# Patient Record
Sex: MALE | Race: WHITE | NOT HISPANIC OR LATINO | Employment: FULL TIME | URBAN - METROPOLITAN AREA
[De-identification: names, ages, dates, MRNs, and addresses within clinical notes are randomized per-mention and may not be internally consistent; named-entity substitution may affect disease eponyms.]

---

## 2018-10-26 ENCOUNTER — OFFICE VISIT CONVERTED (OUTPATIENT)
Dept: FAMILY MEDICINE CLINIC | Facility: CLINIC | Age: 53
End: 2018-10-26
Attending: FAMILY MEDICINE

## 2018-12-04 ENCOUNTER — CONVERSION ENCOUNTER (OUTPATIENT)
Dept: FAMILY MEDICINE CLINIC | Facility: CLINIC | Age: 53
End: 2018-12-04

## 2018-12-05 ENCOUNTER — OFFICE VISIT CONVERTED (OUTPATIENT)
Dept: FAMILY MEDICINE CLINIC | Facility: CLINIC | Age: 53
End: 2018-12-05
Attending: NURSE PRACTITIONER

## 2018-12-14 ENCOUNTER — OFFICE VISIT CONVERTED (OUTPATIENT)
Dept: FAMILY MEDICINE CLINIC | Facility: CLINIC | Age: 53
End: 2018-12-14
Attending: NURSE PRACTITIONER

## 2018-12-26 ENCOUNTER — OFFICE VISIT CONVERTED (OUTPATIENT)
Dept: FAMILY MEDICINE CLINIC | Facility: CLINIC | Age: 53
End: 2018-12-26
Attending: NURSE PRACTITIONER

## 2018-12-28 ENCOUNTER — CONVERSION ENCOUNTER (OUTPATIENT)
Dept: FAMILY MEDICINE CLINIC | Facility: CLINIC | Age: 53
End: 2018-12-28

## 2019-01-02 ENCOUNTER — HOSPITAL ENCOUNTER (OUTPATIENT)
Dept: FAMILY MEDICINE CLINIC | Facility: CLINIC | Age: 54
Discharge: HOME | End: 2019-01-02
Attending: NURSE PRACTITIONER

## 2019-01-02 LAB
BASOPHILS # BLD AUTO: 0.01 10*3/UL (ref 0–0.2)
BASOPHILS NFR BLD AUTO: 0.07 % (ref 0–3)
EOSINOPHIL # BLD AUTO: 0.29 10*3/UL (ref 0–0.7)
EOSINOPHIL # BLD AUTO: 4.38 % (ref 0–7)
ERYTHROCYTE [DISTWIDTH] IN BLOOD BY AUTOMATED COUNT: 13.8 % (ref 11.5–14.5)
FERRITIN SERPL-MCNC: 185 NG/ML (ref 30–300)
FOLATE SERPL-MCNC: >20 NG/ML (ref 4.8–20)
HBA1C MFR BLD: 12.4 G/DL (ref 14–18)
HCT VFR BLD AUTO: 36.1 % (ref 42–52)
IRON SATN MFR SERPL: 25 % (ref 20–55)
IRON SERPL-MCNC: 98 UG/DL (ref 70–180)
LYMPHOCYTES # BLD AUTO: 2.59 10*3/UL (ref 1–5)
MCH RBC QN AUTO: 31 PG (ref 27–31)
MCHC RBC AUTO-ENTMCNC: 34.3 G/DL (ref 33–37)
MCV RBC AUTO: 90.5 FL (ref 80–96)
MONOCYTES # BLD AUTO: 0.52 10*3/UL (ref 0.2–1.2)
MONOCYTES NFR BLD AUTO: 7.84 % (ref 3–10)
NEUTROPHILS # BLD AUTO: 3.17 10*3/UL (ref 2–8)
NEUTROPHILS NFR BLD AUTO: 48.3 % (ref 30–85)
NRBC BLD AUTO-RTO: 0 % (ref 0–0.01)
PLATELET # BLD AUTO: 185 10*3/UL (ref 130–400)
PMV BLD AUTO: 8.2 FL (ref 7.4–10.4)
RBC # BLD AUTO: 3.99 10*6/UL (ref 4.7–6.1)
T4 FREE SERPL-MCNC: 0.6 NG/DL (ref 0.9–1.8)
TIBC SERPL-MCNC: 386 UG/DL (ref 245–450)
TRANSFERRIN SERPL-MCNC: 270 MG/DL (ref 215–365)
TSH SERPL-ACNC: 18.21 M[IU]/L (ref 0.27–4.2)
VARIANT LYMPHS NFR BLD MANUAL: 39.4 % (ref 20–45)
VIT B12 SERPL-MCNC: 238 PG/ML (ref 211–911)
WBC # BLD AUTO: 6.57 10*3/UL (ref 4.8–10.8)

## 2019-01-03 LAB
CONV ANTI MICROSOMAL AB: >600 IU/ML (ref 0–34)
THYROGLOBULIN ANTIBODY: 6.5 IU/ML (ref 0–0.9)

## 2019-01-18 ENCOUNTER — OFFICE VISIT CONVERTED (OUTPATIENT)
Dept: FAMILY MEDICINE CLINIC | Facility: CLINIC | Age: 54
End: 2019-01-18
Attending: NURSE PRACTITIONER

## 2019-05-21 ENCOUNTER — OFFICE VISIT CONVERTED (OUTPATIENT)
Dept: FAMILY MEDICINE CLINIC | Facility: CLINIC | Age: 54
End: 2019-05-21
Attending: NURSE PRACTITIONER

## 2019-05-21 ENCOUNTER — HOSPITAL ENCOUNTER (OUTPATIENT)
Dept: FAMILY MEDICINE CLINIC | Facility: CLINIC | Age: 54
Discharge: HOME | End: 2019-05-21
Attending: NURSE PRACTITIONER

## 2019-05-21 LAB
ALBUMIN SERPL-MCNC: 4.2 G/DL (ref 3.5–5)
ALBUMIN/GLOB SERPL: 1.2 {RATIO} (ref 1.4–2.6)
ALP SERPL-CCNC: 83 U/L (ref 56–119)
ALT SERPL-CCNC: 27 U/L (ref 10–40)
ANION GAP SERPL CALC-SCNC: 18 MMOL/L (ref 8–19)
AST SERPL-CCNC: 26 U/L (ref 15–50)
BASOPHILS # BLD AUTO: 0.03 10*3/UL (ref 0–0.2)
BASOPHILS NFR BLD AUTO: 0.4 % (ref 0–3)
BILIRUB SERPL-MCNC: <0.15 MG/DL (ref 0.2–1.3)
BUN SERPL-MCNC: 6 MG/DL (ref 5–25)
BUN/CREAT SERPL: 7 {RATIO} (ref 6–20)
CALCIUM SERPL-MCNC: 9.1 MG/DL (ref 8.7–10.4)
CHLORIDE SERPL-SCNC: 97 MMOL/L (ref 99–111)
CONV ABS IMM GRAN: 0.02 10*3/UL (ref 0–0.2)
CONV CO2: 25 MMOL/L (ref 22–32)
CONV IMMATURE GRAN: 0.2 % (ref 0–1.8)
CONV TOTAL PROTEIN: 7.6 G/DL (ref 6.3–8.2)
CREAT UR-MCNC: 0.89 MG/DL (ref 0.7–1.2)
DEPRECATED RDW RBC AUTO: 47.8 FL (ref 35.1–43.9)
EOSINOPHIL # BLD AUTO: 0.24 10*3/UL (ref 0–0.7)
EOSINOPHIL # BLD AUTO: 3 % (ref 0–7)
ERYTHROCYTE [DISTWIDTH] IN BLOOD BY AUTOMATED COUNT: 13.9 % (ref 11.6–14.4)
FOLATE SERPL-MCNC: 9.8 NG/ML (ref 4.8–20)
GFR SERPLBLD BASED ON 1.73 SQ M-ARVRAT: >60 ML/MIN/{1.73_M2}
GLOBULIN UR ELPH-MCNC: 3.4 G/DL (ref 2–3.5)
GLUCOSE SERPL-MCNC: 124 MG/DL (ref 70–99)
HBA1C MFR BLD: 12.8 G/DL (ref 14–18)
HCT VFR BLD AUTO: 39.2 % (ref 42–52)
LYMPHOCYTES # BLD AUTO: 3.1 10*3/UL (ref 1–5)
MCH RBC QN AUTO: 30.8 PG (ref 27–31)
MCHC RBC AUTO-ENTMCNC: 32.7 G/DL (ref 33–37)
MCV RBC AUTO: 94.2 FL (ref 80–96)
MONOCYTES # BLD AUTO: 0.6 10*3/UL (ref 0.2–1.2)
MONOCYTES NFR BLD AUTO: 7.5 % (ref 3–10)
NEUTROPHILS # BLD AUTO: 4.05 10*3/UL (ref 2–8)
NEUTROPHILS NFR BLD AUTO: 50.3 % (ref 30–85)
NRBC CBCN: 0 % (ref 0–0.7)
OSMOLALITY SERPL CALC.SUM OF ELEC: 281 MOSM/KG (ref 273–304)
PLATELET # BLD AUTO: 357 10*3/UL (ref 130–400)
PMV BLD AUTO: 11 FL (ref 9.4–12.4)
POTASSIUM SERPL-SCNC: 3.6 MMOL/L (ref 3.5–5.3)
RBC # BLD AUTO: 4.16 10*6/UL (ref 4.7–6.1)
SODIUM SERPL-SCNC: 136 MMOL/L (ref 135–147)
T4 FREE SERPL-MCNC: 0.6 NG/DL (ref 0.9–1.8)
TSH SERPL-ACNC: 25.5 M[IU]/L (ref 0.27–4.2)
VARIANT LYMPHS NFR BLD MANUAL: 38.6 % (ref 20–45)
VIT B12 SERPL-MCNC: 396 PG/ML (ref 211–911)
WBC # BLD AUTO: 8.04 10*3/UL (ref 4.8–10.8)

## 2019-05-22 LAB
CONV ANTI MICROSOMAL AB: >600 IU/ML (ref 0–34)
THYROGLOBULIN ANTIBODY: 4.8 IU/ML (ref 0–0.9)

## 2019-05-27 LAB
CONV THYROGLOBULIN ANTIBODY: 4.6 IU/ML (ref 0–0.9)
THYROGLOB SERPL-MCNC: 5.8 NG/ML

## 2019-06-03 ENCOUNTER — HOSPITAL ENCOUNTER (OUTPATIENT)
Dept: OTHER | Facility: HOSPITAL | Age: 54
Discharge: HOME | End: 2019-06-03
Attending: NURSE PRACTITIONER

## 2019-07-02 ENCOUNTER — HOSPITAL ENCOUNTER (OUTPATIENT)
Dept: FAMILY MEDICINE CLINIC | Facility: CLINIC | Age: 54
Discharge: HOME | End: 2019-07-02
Attending: NURSE PRACTITIONER

## 2019-07-02 LAB
T4 FREE SERPL-MCNC: 0.8 NG/DL (ref 0.9–1.8)
TSH SERPL-ACNC: 8.51 M[IU]/L (ref 0.27–4.2)

## 2019-07-03 LAB
CONV ANTI MICROSOMAL AB: >600 IU/ML (ref 0–34)
THYROGLOBULIN ANTIBODY: 4 IU/ML (ref 0–0.9)

## 2019-07-23 ENCOUNTER — OFFICE VISIT CONVERTED (OUTPATIENT)
Dept: FAMILY MEDICINE CLINIC | Facility: CLINIC | Age: 54
End: 2019-07-23
Attending: NURSE PRACTITIONER

## 2019-07-29 ENCOUNTER — CONVERSION ENCOUNTER (OUTPATIENT)
Dept: FAMILY MEDICINE CLINIC | Facility: CLINIC | Age: 54
End: 2019-07-29

## 2019-08-20 ENCOUNTER — CONVERSION ENCOUNTER (OUTPATIENT)
Dept: FAMILY MEDICINE CLINIC | Facility: CLINIC | Age: 54
End: 2019-08-20

## 2019-08-20 ENCOUNTER — HOSPITAL ENCOUNTER (OUTPATIENT)
Dept: FAMILY MEDICINE CLINIC | Facility: CLINIC | Age: 54
Discharge: HOME | End: 2019-08-20
Attending: NURSE PRACTITIONER

## 2019-08-20 LAB
ALBUMIN SERPL-MCNC: 4.5 G/DL (ref 3.5–5)
ALBUMIN/GLOB SERPL: 1.5 {RATIO} (ref 1.4–2.6)
ALP SERPL-CCNC: 79 U/L (ref 56–119)
ALT SERPL-CCNC: 13 U/L (ref 10–40)
ANION GAP SERPL CALC-SCNC: 21 MMOL/L (ref 8–19)
APPEARANCE UR: CLEAR
AST SERPL-CCNC: 16 U/L (ref 15–50)
BASOPHILS # BLD AUTO: 0.03 10*3/UL (ref 0–0.2)
BASOPHILS NFR BLD AUTO: 0.5 % (ref 0–3)
BILIRUB SERPL-MCNC: 0.23 MG/DL (ref 0.2–1.3)
BILIRUB UR QL: NEGATIVE
BUN SERPL-MCNC: 10 MG/DL (ref 5–25)
BUN/CREAT SERPL: 12 {RATIO} (ref 6–20)
CALCIUM SERPL-MCNC: 9.2 MG/DL (ref 8.7–10.4)
CHLORIDE SERPL-SCNC: 102 MMOL/L (ref 99–111)
CHOLEST SERPL-MCNC: 187 MG/DL (ref 107–200)
CHOLEST/HDLC SERPL: 5.8 {RATIO} (ref 3–6)
COLOR UR: YELLOW
CONV ABS IMM GRAN: 0.01 10*3/UL (ref 0–0.2)
CONV CO2: 23 MMOL/L (ref 22–32)
CONV COLLECTION SOURCE (UA): NORMAL
CONV CREATININE URINE, RANDOM: 25.7 MG/DL (ref 10–300)
CONV IMMATURE GRAN: 0.2 % (ref 0–1.8)
CONV MICROALBUM.,U,RANDOM: <12 MG/L (ref 0–20)
CONV TOTAL PROTEIN: 7.5 G/DL (ref 6.3–8.2)
CONV UROBILINOGEN IN URINE BY AUTOMATED TEST STRIP: 0.2 {EHRLICHU}/DL (ref 0.1–1)
CREAT UR-MCNC: 0.85 MG/DL (ref 0.7–1.2)
DEPRECATED RDW RBC AUTO: 48.9 FL (ref 35.1–43.9)
EOSINOPHIL # BLD AUTO: 0.21 10*3/UL (ref 0–0.7)
EOSINOPHIL # BLD AUTO: 3.6 % (ref 0–7)
ERYTHROCYTE [DISTWIDTH] IN BLOOD BY AUTOMATED COUNT: 14.8 % (ref 11.6–14.4)
FOLATE SERPL-MCNC: 7.7 NG/ML (ref 4.8–20)
GFR SERPLBLD BASED ON 1.73 SQ M-ARVRAT: >60 ML/MIN/{1.73_M2}
GLOBULIN UR ELPH-MCNC: 3 G/DL (ref 2–3.5)
GLUCOSE SERPL-MCNC: 114 MG/DL (ref 70–99)
GLUCOSE UR QL: NEGATIVE MG/DL
HCT VFR BLD AUTO: 36.4 % (ref 42–52)
HDLC SERPL-MCNC: 32 MG/DL (ref 40–60)
HGB BLD-MCNC: 12 G/DL (ref 14–18)
HGB UR QL STRIP: NEGATIVE
KETONES UR QL STRIP: NEGATIVE MG/DL
LDLC SERPL CALC-MCNC: 116 MG/DL (ref 70–100)
LEUKOCYTE ESTERASE UR QL STRIP: NEGATIVE
LYMPHOCYTES # BLD AUTO: 2.2 10*3/UL (ref 1–5)
LYMPHOCYTES NFR BLD AUTO: 37.5 % (ref 20–45)
MCH RBC QN AUTO: 29.6 PG (ref 27–31)
MCHC RBC AUTO-ENTMCNC: 33 G/DL (ref 33–37)
MCV RBC AUTO: 89.9 FL (ref 80–96)
MICROALBUMIN/CREAT UR: 46.7 MG/G{CRE} (ref 0–25)
MONOCYTES # BLD AUTO: 0.48 10*3/UL (ref 0.2–1.2)
MONOCYTES NFR BLD AUTO: 8.2 % (ref 3–10)
NEUTROPHILS # BLD AUTO: 2.93 10*3/UL (ref 2–8)
NEUTROPHILS NFR BLD AUTO: 50 % (ref 30–85)
NITRITE UR QL STRIP: NEGATIVE
NRBC CBCN: 0 % (ref 0–0.7)
OSMOLALITY SERPL CALC.SUM OF ELEC: 294 MOSM/KG (ref 273–304)
PH UR STRIP.AUTO: 6.5 [PH] (ref 5–8)
PLATELET # BLD AUTO: 297 10*3/UL (ref 130–400)
PMV BLD AUTO: 11.1 FL (ref 9.4–12.4)
POTASSIUM SERPL-SCNC: 3.9 MMOL/L (ref 3.5–5.3)
PROT UR QL: NEGATIVE MG/DL
RBC # BLD AUTO: 4.05 10*6/UL (ref 4.7–6.1)
SODIUM SERPL-SCNC: 142 MMOL/L (ref 135–147)
SP GR UR: 1.01 (ref 1–1.03)
T4 FREE SERPL-MCNC: 0.9 NG/DL (ref 0.9–1.8)
TRIGL SERPL-MCNC: 195 MG/DL (ref 40–150)
TSH SERPL-ACNC: 6.37 M[IU]/L (ref 0.27–4.2)
VIT B12 SERPL-MCNC: 326 PG/ML (ref 211–911)
VLDLC SERPL-MCNC: 39 MG/DL (ref 5–37)
WBC # BLD AUTO: 5.86 10*3/UL (ref 4.8–10.8)

## 2019-08-21 LAB
CONV ANTI MICROSOMAL AB: >600 IU/ML (ref 0–34)
THYROGLOBULIN ANTIBODY: 5.6 IU/ML (ref 0–0.9)

## 2019-09-10 ENCOUNTER — CONVERSION ENCOUNTER (OUTPATIENT)
Dept: FAMILY MEDICINE CLINIC | Facility: CLINIC | Age: 54
End: 2019-09-10

## 2019-09-18 ENCOUNTER — OFFICE VISIT CONVERTED (OUTPATIENT)
Dept: FAMILY MEDICINE CLINIC | Facility: CLINIC | Age: 54
End: 2019-09-18
Attending: NURSE PRACTITIONER

## 2019-10-21 ENCOUNTER — OFFICE VISIT CONVERTED (OUTPATIENT)
Dept: FAMILY MEDICINE CLINIC | Facility: CLINIC | Age: 54
End: 2019-10-21
Attending: NURSE PRACTITIONER

## 2019-10-21 ENCOUNTER — HOSPITAL ENCOUNTER (OUTPATIENT)
Dept: FAMILY MEDICINE CLINIC | Facility: CLINIC | Age: 54
Discharge: HOME | End: 2019-10-21
Attending: NURSE PRACTITIONER

## 2019-10-21 LAB
ALBUMIN SERPL-MCNC: 4.6 G/DL (ref 3.5–5)
ALBUMIN/GLOB SERPL: 1.3 {RATIO} (ref 1.4–2.6)
ALP SERPL-CCNC: 81 U/L (ref 56–119)
ALT SERPL-CCNC: 12 U/L (ref 10–40)
ANION GAP SERPL CALC-SCNC: 19 MMOL/L (ref 8–19)
APPEARANCE UR: CLEAR
AST SERPL-CCNC: 15 U/L (ref 15–50)
BASOPHILS # BLD AUTO: 0.02 10*3/UL (ref 0–0.2)
BASOPHILS NFR BLD AUTO: 0.2 % (ref 0–3)
BILIRUB SERPL-MCNC: 0.24 MG/DL (ref 0.2–1.3)
BILIRUB UR QL: NEGATIVE
BUN SERPL-MCNC: 14 MG/DL (ref 5–25)
BUN/CREAT SERPL: 15 {RATIO} (ref 6–20)
CALCIUM SERPL-MCNC: 9.9 MG/DL (ref 8.7–10.4)
CHLORIDE SERPL-SCNC: 101 MMOL/L (ref 99–111)
CHOLEST SERPL-MCNC: 215 MG/DL (ref 107–200)
CHOLEST/HDLC SERPL: 6.3 {RATIO} (ref 3–6)
COLOR UR: YELLOW
CONV ABS IMM GRAN: 0.03 10*3/UL (ref 0–0.2)
CONV CO2: 23 MMOL/L (ref 22–32)
CONV COLLECTION SOURCE (UA): NORMAL
CONV CREATININE URINE, RANDOM: 132.1 MG/DL (ref 10–300)
CONV IMMATURE GRAN: 0.4 % (ref 0–1.8)
CONV MICROALBUM.,U,RANDOM: 42.1 MG/L (ref 0–20)
CONV TOTAL PROTEIN: 8.1 G/DL (ref 6.3–8.2)
CONV UROBILINOGEN IN URINE BY AUTOMATED TEST STRIP: 0.2 {EHRLICHU}/DL (ref 0.1–1)
CREAT UR-MCNC: 0.92 MG/DL (ref 0.7–1.2)
DEPRECATED RDW RBC AUTO: 48.8 FL (ref 35.1–43.9)
EOSINOPHIL # BLD AUTO: 0.17 10*3/UL (ref 0–0.7)
EOSINOPHIL # BLD AUTO: 2 % (ref 0–7)
ERYTHROCYTE [DISTWIDTH] IN BLOOD BY AUTOMATED COUNT: 15 % (ref 11.6–14.4)
FERRITIN SERPL-MCNC: 333 NG/ML (ref 30–300)
FOLATE SERPL-MCNC: 11.4 NG/ML (ref 4.8–20)
GFR SERPLBLD BASED ON 1.73 SQ M-ARVRAT: >60 ML/MIN/{1.73_M2}
GLOBULIN UR ELPH-MCNC: 3.5 G/DL (ref 2–3.5)
GLUCOSE SERPL-MCNC: 99 MG/DL (ref 70–99)
GLUCOSE UR QL: NEGATIVE MG/DL
HCT VFR BLD AUTO: 36.1 % (ref 42–52)
HDLC SERPL-MCNC: 34 MG/DL (ref 40–60)
HGB BLD-MCNC: 11.8 G/DL (ref 14–18)
HGB UR QL STRIP: NEGATIVE
IRON SATN MFR SERPL: 16 % (ref 20–55)
IRON SERPL-MCNC: 51 UG/DL (ref 70–180)
KETONES UR QL STRIP: NEGATIVE MG/DL
LDLC SERPL CALC-MCNC: 148 MG/DL (ref 70–100)
LEUKOCYTE ESTERASE UR QL STRIP: NEGATIVE
LYMPHOCYTES # BLD AUTO: 2.57 10*3/UL (ref 1–5)
LYMPHOCYTES NFR BLD AUTO: 30.3 % (ref 20–45)
MCH RBC QN AUTO: 29.1 PG (ref 27–31)
MCHC RBC AUTO-ENTMCNC: 32.7 G/DL (ref 33–37)
MCV RBC AUTO: 89.1 FL (ref 80–96)
MICROALBUMIN/CREAT UR: 31.9 MG/G{CRE} (ref 0–25)
MONOCYTES # BLD AUTO: 0.64 10*3/UL (ref 0.2–1.2)
MONOCYTES NFR BLD AUTO: 7.5 % (ref 3–10)
NEUTROPHILS # BLD AUTO: 5.05 10*3/UL (ref 2–8)
NEUTROPHILS NFR BLD AUTO: 59.6 % (ref 30–85)
NITRITE UR QL STRIP: NEGATIVE
NRBC CBCN: 0 % (ref 0–0.7)
OSMOLALITY SERPL CALC.SUM OF ELEC: 287 MOSM/KG (ref 273–304)
PH UR STRIP.AUTO: 7 [PH] (ref 5–8)
PLATELET # BLD AUTO: 397 10*3/UL (ref 130–400)
PMV BLD AUTO: 10.5 FL (ref 9.4–12.4)
POTASSIUM SERPL-SCNC: 4.6 MMOL/L (ref 3.5–5.3)
PROT UR QL: NEGATIVE MG/DL
RBC # BLD AUTO: 4.05 10*6/UL (ref 4.7–6.1)
SODIUM SERPL-SCNC: 138 MMOL/L (ref 135–147)
SP GR UR: 1.01 (ref 1–1.03)
T4 FREE SERPL-MCNC: 1 NG/DL (ref 0.9–1.8)
TIBC SERPL-MCNC: 326 UG/DL (ref 245–450)
TRANSFERRIN SERPL-MCNC: 228 MG/DL (ref 215–365)
TRIGL SERPL-MCNC: 166 MG/DL (ref 40–150)
TSH SERPL-ACNC: 2.26 M[IU]/L (ref 0.27–4.2)
VIT B12 SERPL-MCNC: 500 PG/ML (ref 211–911)
VLDLC SERPL-MCNC: 33 MG/DL (ref 5–37)
WBC # BLD AUTO: 8.48 10*3/UL (ref 4.8–10.8)

## 2019-10-22 LAB
CONV ANTI MICROSOMAL AB: >600 IU/ML (ref 0–34)
THYROGLOBULIN ANTIBODY: 5.5 IU/ML (ref 0–0.9)

## 2020-02-28 ENCOUNTER — OFFICE VISIT CONVERTED (OUTPATIENT)
Dept: FAMILY MEDICINE CLINIC | Facility: CLINIC | Age: 55
End: 2020-02-28
Attending: NURSE PRACTITIONER

## 2020-02-28 ENCOUNTER — HOSPITAL ENCOUNTER (OUTPATIENT)
Dept: FAMILY MEDICINE CLINIC | Facility: CLINIC | Age: 55
Discharge: HOME | End: 2020-02-28
Attending: NURSE PRACTITIONER

## 2020-02-28 LAB
ALBUMIN SERPL-MCNC: 4.5 G/DL (ref 3.5–5)
ALBUMIN/GLOB SERPL: 1.3 {RATIO} (ref 1.4–2.6)
ALP SERPL-CCNC: 80 U/L (ref 56–119)
ALT SERPL-CCNC: 20 U/L (ref 10–40)
ANION GAP SERPL CALC-SCNC: 29 MMOL/L (ref 8–19)
APPEARANCE UR: CLEAR
AST SERPL-CCNC: 21 U/L (ref 15–50)
BASOPHILS # BLD AUTO: 0.04 10*3/UL (ref 0–0.2)
BASOPHILS NFR BLD AUTO: 0.4 % (ref 0–3)
BILIRUB SERPL-MCNC: 0.17 MG/DL (ref 0.2–1.3)
BILIRUB UR QL: NEGATIVE
BUN SERPL-MCNC: 9 MG/DL (ref 5–25)
BUN/CREAT SERPL: 8 {RATIO} (ref 6–20)
CALCIUM SERPL-MCNC: 9.6 MG/DL (ref 8.7–10.4)
CHLORIDE SERPL-SCNC: 98 MMOL/L (ref 99–111)
CHOLEST SERPL-MCNC: 214 MG/DL (ref 107–200)
CHOLEST/HDLC SERPL: 5.6 {RATIO} (ref 3–6)
COLOR UR: YELLOW
CONV ABS IMM GRAN: 0.03 10*3/UL (ref 0–0.2)
CONV CO2: 17 MMOL/L (ref 22–32)
CONV COLLECTION SOURCE (UA): NORMAL
CONV CREATININE URINE, RANDOM: 46.4 MG/DL (ref 10–300)
CONV IMMATURE GRAN: 0.3 % (ref 0–1.8)
CONV MICROALBUM.,U,RANDOM: <12 MG/L (ref 0–20)
CONV TOTAL PROTEIN: 8.1 G/DL (ref 6.3–8.2)
CONV UROBILINOGEN IN URINE BY AUTOMATED TEST STRIP: 0.2 {EHRLICHU}/DL (ref 0.1–1)
CREAT UR-MCNC: 1.09 MG/DL (ref 0.7–1.2)
DEPRECATED RDW RBC AUTO: 49 FL (ref 35.1–43.9)
EOSINOPHIL # BLD AUTO: 0.27 10*3/UL (ref 0–0.7)
EOSINOPHIL # BLD AUTO: 2.8 % (ref 0–7)
ERYTHROCYTE [DISTWIDTH] IN BLOOD BY AUTOMATED COUNT: 14.4 % (ref 11.6–14.4)
GFR SERPLBLD BASED ON 1.73 SQ M-ARVRAT: >60 ML/MIN/{1.73_M2}
GLOBULIN UR ELPH-MCNC: 3.6 G/DL (ref 2–3.5)
GLUCOSE SERPL-MCNC: 81 MG/DL (ref 70–99)
GLUCOSE UR QL: NEGATIVE MG/DL
HCT VFR BLD AUTO: 39.9 % (ref 42–52)
HDLC SERPL-MCNC: 38 MG/DL (ref 40–60)
HGB BLD-MCNC: 13 G/DL (ref 14–18)
HGB UR QL STRIP: NEGATIVE
KETONES UR QL STRIP: NEGATIVE MG/DL
LDLC SERPL CALC-MCNC: 124 MG/DL (ref 70–100)
LEUKOCYTE ESTERASE UR QL STRIP: NEGATIVE
LYMPHOCYTES # BLD AUTO: 2.99 10*3/UL (ref 1–5)
LYMPHOCYTES NFR BLD AUTO: 31.1 % (ref 20–45)
MCH RBC QN AUTO: 30.1 PG (ref 27–31)
MCHC RBC AUTO-ENTMCNC: 32.6 G/DL (ref 33–37)
MCV RBC AUTO: 92.4 FL (ref 80–96)
MICROALBUMIN/CREAT UR: 25.9 MG/G{CRE} (ref 0–25)
MONOCYTES # BLD AUTO: 0.84 10*3/UL (ref 0.2–1.2)
MONOCYTES NFR BLD AUTO: 8.7 % (ref 3–10)
NEUTROPHILS # BLD AUTO: 5.44 10*3/UL (ref 2–8)
NEUTROPHILS NFR BLD AUTO: 56.7 % (ref 30–85)
NITRITE UR QL STRIP: NEGATIVE
NRBC CBCN: 0 % (ref 0–0.7)
OSMOLALITY SERPL CALC.SUM OF ELEC: 288 MOSM/KG (ref 273–304)
PH UR STRIP.AUTO: 5.5 [PH] (ref 5–8)
PLATELET # BLD AUTO: 340 10*3/UL (ref 130–400)
PMV BLD AUTO: 10.9 FL (ref 9.4–12.4)
POTASSIUM SERPL-SCNC: 4.1 MMOL/L (ref 3.5–5.3)
PROT UR QL: NEGATIVE MG/DL
PSA SERPL-MCNC: 0.61 NG/ML (ref 0–4)
RBC # BLD AUTO: 4.32 10*6/UL (ref 4.7–6.1)
SODIUM SERPL-SCNC: 140 MMOL/L (ref 135–147)
SP GR UR: 1.01 (ref 1–1.03)
T4 FREE SERPL-MCNC: 1 NG/DL (ref 0.9–1.8)
TRIGL SERPL-MCNC: 259 MG/DL (ref 40–150)
TSH SERPL-ACNC: 6.69 M[IU]/L (ref 0.27–4.2)
VLDLC SERPL-MCNC: 52 MG/DL (ref 5–37)
WBC # BLD AUTO: 9.61 10*3/UL (ref 4.8–10.8)

## 2020-06-09 ENCOUNTER — HOSPITAL ENCOUNTER (OUTPATIENT)
Dept: FAMILY MEDICINE CLINIC | Facility: CLINIC | Age: 55
Discharge: HOME | End: 2020-06-09
Attending: NURSE PRACTITIONER

## 2020-06-10 LAB
T4 FREE SERPL-MCNC: 1 NG/DL (ref 0.9–1.8)
TSH SERPL-ACNC: 3.18 M[IU]/L (ref 0.27–4.2)

## 2020-10-07 ENCOUNTER — HOSPITAL ENCOUNTER (OUTPATIENT)
Dept: FAMILY MEDICINE CLINIC | Facility: CLINIC | Age: 55
Discharge: HOME | End: 2020-10-07
Attending: FAMILY MEDICINE

## 2020-10-07 ENCOUNTER — CONVERSION ENCOUNTER (OUTPATIENT)
Dept: FAMILY MEDICINE CLINIC | Facility: CLINIC | Age: 55
End: 2020-10-07

## 2020-10-07 ENCOUNTER — OFFICE VISIT CONVERTED (OUTPATIENT)
Dept: FAMILY MEDICINE CLINIC | Facility: CLINIC | Age: 55
End: 2020-10-07
Attending: FAMILY MEDICINE

## 2020-10-07 LAB
ALBUMIN SERPL-MCNC: 4.5 G/DL (ref 3.5–5)
ALBUMIN/GLOB SERPL: 1.6 {RATIO} (ref 1.4–2.6)
ALP SERPL-CCNC: 79 U/L (ref 56–119)
ALT SERPL-CCNC: 15 U/L (ref 10–40)
ANION GAP SERPL CALC-SCNC: 19 MMOL/L (ref 8–19)
AST SERPL-CCNC: 21 U/L (ref 15–50)
BASOPHILS # BLD AUTO: 0.03 10*3/UL (ref 0–0.2)
BASOPHILS # BLD: 0 % (ref 0–3)
BASOPHILS NFR BLD AUTO: 0.4 % (ref 0–3)
BILIRUB SERPL-MCNC: 0.18 MG/DL (ref 0.2–1.3)
BUN SERPL-MCNC: 24 MG/DL (ref 5–25)
BUN/CREAT SERPL: 19 {RATIO} (ref 6–20)
BURR CELLS BLD QL SMEAR: ABNORMAL
CALCIUM SERPL-MCNC: 9.3 MG/DL (ref 8.7–10.4)
CHLORIDE SERPL-SCNC: 98 MMOL/L (ref 99–111)
CHOLEST SERPL-MCNC: 202 MG/DL (ref 107–200)
CHOLEST/HDLC SERPL: 6.7 {RATIO} (ref 3–6)
CONV ABS BANDS: 4 % (ref 1–5)
CONV ABS IMM GRAN: 0.02 10*3/UL (ref 0–0.2)
CONV ANISOCYTES: SLIGHT
CONV ATYPICAL LYMPHOCYTES: 2 % (ref 0–5)
CONV CO2: 24 MMOL/L (ref 22–32)
CONV IMMATURE GRAN: 0.3 % (ref 0–1.8)
CONV SEGMENTED NEUTROPHILS: 50 % (ref 45–70)
CONV TOTAL PROTEIN: 7.4 G/DL (ref 6.3–8.2)
CREAT UR-MCNC: 1.28 MG/DL (ref 0.7–1.2)
DACRYOCYTES BLD QL SMEAR: SLIGHT
DEPRECATED RDW RBC AUTO: 44.4 FL (ref 35.1–43.9)
EOSINOPHIL # BLD AUTO: 0.22 10*3/UL (ref 0–0.7)
EOSINOPHIL # BLD AUTO: 2.8 % (ref 0–7)
EOSINOPHIL NFR BLD AUTO: 1 % (ref 0–7)
ERYTHROCYTE [DISTWIDTH] IN BLOOD BY AUTOMATED COUNT: 13.3 % (ref 11.6–14.4)
GFR SERPLBLD BASED ON 1.73 SQ M-ARVRAT: >60 ML/MIN/{1.73_M2}
GLOBULIN UR ELPH-MCNC: 2.9 G/DL (ref 2–3.5)
GLUCOSE SERPL-MCNC: 98 MG/DL (ref 70–99)
HCT VFR BLD AUTO: 34 % (ref 42–52)
HDLC SERPL-MCNC: 30 MG/DL (ref 40–60)
HGB BLD-MCNC: 11.4 G/DL (ref 14–18)
LARGE PLATELETS: SLIGHT
LDLC SERPL CALC-MCNC: 93 MG/DL (ref 70–100)
LYMPHOCYTES # BLD AUTO: 3.56 10*3/UL (ref 1–5)
LYMPHOCYTES NFR BLD AUTO: 44.7 % (ref 20–45)
MACROCYTES BLD QL SMEAR: SLIGHT
MCH RBC QN AUTO: 30.8 PG (ref 27–31)
MCHC RBC AUTO-ENTMCNC: 33.5 G/DL (ref 33–37)
MCV RBC AUTO: 91.9 FL (ref 80–96)
MONOCYTES # BLD AUTO: 0.59 10*3/UL (ref 0.2–1.2)
MONOCYTES NFR BLD AUTO: 7.4 % (ref 3–10)
MONOCYTES NFR BLD MANUAL: 4 % (ref 3–10)
NEUTROPHILS # BLD AUTO: 3.54 10*3/UL (ref 2–8)
NEUTROPHILS NFR BLD AUTO: 44.4 % (ref 30–85)
NRBC CBCN: 0 % (ref 0–0.7)
NUC CELL # PRT MANUAL: 0 /100{WBCS}
OSMOLALITY SERPL CALC.SUM OF ELEC: 288 MOSM/KG (ref 273–304)
OVALOCYTES BLD QL SMEAR: SLIGHT
PLAT MORPH BLD: NORMAL
PLATELET # BLD AUTO: 382 10*3/UL (ref 130–400)
PMV BLD AUTO: 10.3 FL (ref 9.4–12.4)
POIKILOCYTOSIS BLD QL SMEAR: SLIGHT
POLYCHROMASIA BLD QL SMEAR: SLIGHT
POTASSIUM SERPL-SCNC: 3.6 MMOL/L (ref 3.5–5.3)
RBC # BLD AUTO: 3.7 10*6/UL (ref 4.7–6.1)
SMALL PLATELETS BLD QL SMEAR: ADEQUATE
SODIUM SERPL-SCNC: 137 MMOL/L (ref 135–147)
T4 FREE SERPL-MCNC: 1 NG/DL (ref 0.9–1.8)
TRIGL SERPL-MCNC: 397 MG/DL (ref 40–150)
TSH SERPL-ACNC: 3.95 M[IU]/L (ref 0.27–4.2)
VARIANT LYMPHS NFR BLD MANUAL: 39 % (ref 20–45)
VLDLC SERPL-MCNC: 79 MG/DL (ref 5–37)
WBC # BLD AUTO: 7.96 10*3/UL (ref 4.8–10.8)

## 2020-10-08 LAB — HCV AB SER DONR QL: <0.1 S/CO RATIO (ref 0–0.9)

## 2020-10-09 LAB
CONV ANTI MICROSOMAL AB: >600 IU/ML (ref 0–34)
THYROGLOBULIN ANTIBODY: 3.6 IU/ML (ref 0–0.9)

## 2021-05-09 VITALS
BODY MASS INDEX: 28.49 KG/M2 | DIASTOLIC BLOOD PRESSURE: 80 MMHG | HEART RATE: 96 BPM | WEIGHT: 188 LBS | HEIGHT: 68 IN | SYSTOLIC BLOOD PRESSURE: 120 MMHG | OXYGEN SATURATION: 96 %

## 2021-05-09 VITALS
TEMPERATURE: 97.5 F | HEIGHT: 68 IN | SYSTOLIC BLOOD PRESSURE: 142 MMHG | DIASTOLIC BLOOD PRESSURE: 84 MMHG | HEART RATE: 85 BPM | WEIGHT: 190.5 LBS | OXYGEN SATURATION: 97 % | BODY MASS INDEX: 28.87 KG/M2

## 2021-05-09 VITALS — DIASTOLIC BLOOD PRESSURE: 64 MMHG | SYSTOLIC BLOOD PRESSURE: 144 MMHG

## 2021-05-09 VITALS
WEIGHT: 189 LBS | HEART RATE: 95 BPM | OXYGEN SATURATION: 97 % | SYSTOLIC BLOOD PRESSURE: 120 MMHG | TEMPERATURE: 96.8 F | DIASTOLIC BLOOD PRESSURE: 74 MMHG

## 2021-05-09 VITALS
HEART RATE: 96 BPM | DIASTOLIC BLOOD PRESSURE: 108 MMHG | SYSTOLIC BLOOD PRESSURE: 220 MMHG | WEIGHT: 191 LBS | OXYGEN SATURATION: 99 %

## 2021-05-09 VITALS
HEIGHT: 68 IN | WEIGHT: 195.06 LBS | DIASTOLIC BLOOD PRESSURE: 76 MMHG | SYSTOLIC BLOOD PRESSURE: 130 MMHG | TEMPERATURE: 98.1 F | HEART RATE: 107 BPM | OXYGEN SATURATION: 98 % | BODY MASS INDEX: 29.56 KG/M2

## 2021-05-09 VITALS
TEMPERATURE: 97.5 F | DIASTOLIC BLOOD PRESSURE: 98 MMHG | SYSTOLIC BLOOD PRESSURE: 168 MMHG | OXYGEN SATURATION: 99 % | HEART RATE: 90 BPM | WEIGHT: 190 LBS | SYSTOLIC BLOOD PRESSURE: 182 MMHG | DIASTOLIC BLOOD PRESSURE: 102 MMHG

## 2021-05-09 VITALS
TEMPERATURE: 97.2 F | SYSTOLIC BLOOD PRESSURE: 110 MMHG | DIASTOLIC BLOOD PRESSURE: 70 MMHG | OXYGEN SATURATION: 97 % | WEIGHT: 189 LBS | HEART RATE: 88 BPM

## 2021-05-09 VITALS
WEIGHT: 200 LBS | OXYGEN SATURATION: 98 % | DIASTOLIC BLOOD PRESSURE: 96 MMHG | HEART RATE: 83 BPM | SYSTOLIC BLOOD PRESSURE: 190 MMHG

## 2021-05-09 VITALS — DIASTOLIC BLOOD PRESSURE: 82 MMHG | SYSTOLIC BLOOD PRESSURE: 162 MMHG

## 2021-05-09 VITALS
OXYGEN SATURATION: 97 % | DIASTOLIC BLOOD PRESSURE: 84 MMHG | WEIGHT: 192 LBS | HEART RATE: 103 BPM | SYSTOLIC BLOOD PRESSURE: 160 MMHG

## 2021-05-09 VITALS — DIASTOLIC BLOOD PRESSURE: 82 MMHG | SYSTOLIC BLOOD PRESSURE: 150 MMHG

## 2021-05-09 VITALS
WEIGHT: 190 LBS | DIASTOLIC BLOOD PRESSURE: 90 MMHG | HEART RATE: 90 BPM | SYSTOLIC BLOOD PRESSURE: 160 MMHG | OXYGEN SATURATION: 97 %

## 2021-05-09 VITALS
DIASTOLIC BLOOD PRESSURE: 82 MMHG | HEART RATE: 84 BPM | OXYGEN SATURATION: 98 % | WEIGHT: 188 LBS | SYSTOLIC BLOOD PRESSURE: 146 MMHG | TEMPERATURE: 97.4 F

## 2021-05-09 VITALS — DIASTOLIC BLOOD PRESSURE: 60 MMHG | SYSTOLIC BLOOD PRESSURE: 132 MMHG

## 2021-05-14 ENCOUNTER — HOSPITAL ENCOUNTER (OUTPATIENT)
Dept: FAMILY MEDICINE CLINIC | Facility: CLINIC | Age: 56
Discharge: HOME | End: 2021-05-14
Attending: NURSE PRACTITIONER

## 2021-05-14 ENCOUNTER — OFFICE VISIT CONVERTED (OUTPATIENT)
Dept: FAMILY MEDICINE CLINIC | Facility: CLINIC | Age: 56
End: 2021-05-14
Attending: NURSE PRACTITIONER

## 2021-05-14 LAB
ALBUMIN SERPL-MCNC: 4.7 G/DL (ref 3.5–5)
ALBUMIN/GLOB SERPL: 1.3 {RATIO} (ref 1.4–2.6)
ALP SERPL-CCNC: 72 U/L (ref 56–119)
ALT SERPL-CCNC: 19 U/L (ref 10–40)
ANION GAP SERPL CALC-SCNC: 23 MMOL/L (ref 8–19)
APPEARANCE UR: CLEAR
AST SERPL-CCNC: 21 U/L (ref 15–50)
BASOPHILS # BLD AUTO: 0.03 10*3/UL (ref 0–0.2)
BASOPHILS NFR BLD AUTO: 0.3 % (ref 0–3)
BILIRUB SERPL-MCNC: 0.26 MG/DL (ref 0.2–1.3)
BILIRUB UR QL: NEGATIVE
BUN SERPL-MCNC: 12 MG/DL (ref 5–25)
BUN/CREAT SERPL: 9 {RATIO} (ref 6–20)
CALCIUM SERPL-MCNC: 9.4 MG/DL (ref 8.7–10.4)
CHLORIDE SERPL-SCNC: 99 MMOL/L (ref 99–111)
CHOLEST SERPL-MCNC: 213 MG/DL (ref 107–200)
CHOLEST/HDLC SERPL: 5.5 {RATIO} (ref 3–6)
COLOR UR: YELLOW
CONV ABS IMM GRAN: 0.03 10*3/UL (ref 0–0.2)
CONV CO2: 20 MMOL/L (ref 22–32)
CONV COLLECTION SOURCE (UA): NORMAL
CONV CREATININE URINE, RANDOM: 182.4 MG/DL (ref 10–300)
CONV IMMATURE GRAN: 0.3 % (ref 0–1.8)
CONV MICROALBUM.,U,RANDOM: 58.3 MG/L (ref 0–20)
CONV TOTAL PROTEIN: 8.3 G/DL (ref 6.3–8.2)
CONV UROBILINOGEN IN URINE BY AUTOMATED TEST STRIP: 0.2 {EHRLICHU}/DL (ref 0.1–1)
CREAT UR-MCNC: 1.28 MG/DL (ref 0.7–1.2)
DEPRECATED RDW RBC AUTO: 47.3 FL (ref 35.1–43.9)
EOSINOPHIL # BLD AUTO: 0.14 10*3/UL (ref 0–0.7)
EOSINOPHIL # BLD AUTO: 1.4 % (ref 0–7)
ERYTHROCYTE [DISTWIDTH] IN BLOOD BY AUTOMATED COUNT: 13.9 % (ref 11.6–14.4)
FERRITIN SERPL-MCNC: 309 NG/ML (ref 30–300)
FOLATE SERPL-MCNC: 3.7 NG/ML (ref 4.8–20)
GFR SERPLBLD BASED ON 1.73 SQ M-ARVRAT: >60 ML/MIN/{1.73_M2}
GLOBULIN UR ELPH-MCNC: 3.6 G/DL (ref 2–3.5)
GLUCOSE SERPL-MCNC: 98 MG/DL (ref 70–99)
GLUCOSE UR QL: NEGATIVE MG/DL
HCT VFR BLD AUTO: 36.9 % (ref 42–52)
HDLC SERPL-MCNC: 39 MG/DL (ref 40–60)
HGB BLD-MCNC: 12.1 G/DL (ref 14–18)
HGB UR QL STRIP: NEGATIVE
IRON SATN MFR SERPL: 13 % (ref 20–55)
IRON SERPL-MCNC: 43 UG/DL (ref 70–180)
KETONES UR QL STRIP: NEGATIVE MG/DL
LDLC SERPL CALC-MCNC: 129 MG/DL (ref 70–100)
LEUKOCYTE ESTERASE UR QL STRIP: NEGATIVE
LYMPHOCYTES # BLD AUTO: 1.84 10*3/UL (ref 1–5)
LYMPHOCYTES NFR BLD AUTO: 18.2 % (ref 20–45)
MCH RBC QN AUTO: 30.4 PG (ref 27–31)
MCHC RBC AUTO-ENTMCNC: 32.8 G/DL (ref 33–37)
MCV RBC AUTO: 92.7 FL (ref 80–96)
MICROALBUMIN/CREAT UR: 32 MG/G{CRE} (ref 0–25)
MONOCYTES # BLD AUTO: 0.67 10*3/UL (ref 0.2–1.2)
MONOCYTES NFR BLD AUTO: 6.6 % (ref 3–10)
NEUTROPHILS # BLD AUTO: 7.38 10*3/UL (ref 2–8)
NEUTROPHILS NFR BLD AUTO: 73.2 % (ref 30–85)
NITRITE UR QL STRIP: NEGATIVE
NRBC CBCN: 0 % (ref 0–0.7)
OSMOLALITY SERPL CALC.SUM OF ELEC: 286 MOSM/KG (ref 273–304)
PH UR STRIP.AUTO: 5.5 [PH] (ref 5–8)
PLATELET # BLD AUTO: 362 10*3/UL (ref 130–400)
PMV BLD AUTO: 10.9 FL (ref 9.4–12.4)
POTASSIUM SERPL-SCNC: 4.3 MMOL/L (ref 3.5–5.3)
PROT UR QL: NORMAL MG/DL
PSA SERPL-MCNC: 0.61 NG/ML (ref 0–4)
RBC # BLD AUTO: 3.98 10*6/UL (ref 4.7–6.1)
SODIUM SERPL-SCNC: 138 MMOL/L (ref 135–147)
SP GR UR: 1.02 (ref 1–1.03)
T4 FREE SERPL-MCNC: 0.9 NG/DL (ref 0.9–1.8)
TIBC SERPL-MCNC: 343 UG/DL (ref 245–450)
TRANSFERRIN SERPL-MCNC: 240 MG/DL (ref 215–365)
TRIGL SERPL-MCNC: 226 MG/DL (ref 40–150)
TSH SERPL-ACNC: 3.96 M[IU]/L (ref 0.27–4.2)
VIT B12 SERPL-MCNC: 667 PG/ML (ref 211–911)
VLDLC SERPL-MCNC: 45 MG/DL (ref 5–37)
WBC # BLD AUTO: 10.09 10*3/UL (ref 4.8–10.8)

## 2021-05-17 LAB
CONV ANTI MICROSOMAL AB: >600 IU/ML (ref 0–34)
THYROGLOBULIN ANTIBODY: 5.8 IU/ML (ref 0–0.9)

## 2021-06-05 NOTE — PROGRESS NOTES
Progress Note      Patient Name: Simon Anthony   Patient ID: [id number]   Sex: Male   YOB: 1965    Primary Care Provider: Soheila LINARES   Referring Provider: Soheila LINARSE    Visit Date: May 14, 2021    Provider: VIJAY Medina   [place] [facility] [place] [address] [place] [address] [place], [place]  [address]   Location Phone: [phone]          Chief Complaint     refills---ran out of 88mcg of thyroid med, had some old 75's so he has been taking those   raised spot on RT elbow       History Of Present Illness  Simon Anthony is a 55 year old /White male who presents for evaluation and treatment of:      follow up on chronic health conditions and medication refills.     HTN with HLD - he is currently out of his lisinopril and HCTZ , as well as Lipitor - BP is elevated today - no adverse s/e with meds    anxiety d/o - he is taking Lexapro, but he is currently out of it - he has been out for a week - he can tell that he is out - this AM his CASSANDRA got aggravated that his dog was walking across the floor and made noises, and she huffed, and that almost instantly set him off - No HI/SI.     hx of anemia - he has not had a c'scope - states he cannot get one anyone time soon - it would need to be about three months from now - no blood loss that he can see; none in his stool anyway as far as he can see.     Hypothyroid - he was last RX the 88mcg dose, but ran out, so taking 75mcg currently - has been taking that for about a week, and he can see a difference in the lower dose.     He is still smoking - he is smoking 1 to 1.5 PPD - depends on mood - he has smoked for the past several years - for about 15 to 20 years. He started when he was 15 and quit at 19, but started back to smoking again.     Last PSA was 2/2020 - normal.     He has a spot on his right elbow that he needs looked at - has been there 3 to 4 weeks. Started out small, but then  grew rapidly. Hurts when he bumps it.     He is also sick today - for the past several days has been experiencing cough and congestion - no shortness of breath, but chest feels tight and he is wheezing. Taking Mucinex DM currently.       Past Medical History  Disease Name Date Onset Notes   Anemia --  --    Anxiety --  --    Hypertension --  --    Hypothyroidism --  --          Past Surgical History  Procedure Name Date Notes   *Denies any surgical procedures --  --          Medication List  Name Date Started Instructions   atorvastatin 20 mg oral tablet 10/07/2020 take 1 tablet (20 mg) by oral route once daily at bedtime   cyanocobalamin (vitamin B-12) 1,000 mcg oral tablet  take 1 tablet by oral route daily   escitalopram oxalate 20 mg oral tablet 10/07/2020 take 1 tablet (20 mg) by oral route once daily   hydrochlorothiazide 25 mg oral tablet 10/07/2020 take 1 tablet (25 mg) by oral route once daily   levothyroxine 88 mcg oral tablet 10/07/2020 take 1 tablet (88 mcg) by oral route once daily   lisinopril 20 mg oral tablet 10/07/2020 take 1 tablet by oral route 2 times a day   Men's One Daily 400- mcg oral tablet  --          Allergy List  Allergen Name Date Reaction Notes   NO KNOWN DRUG ALLERGIES --  --  --        Allergies Reconciled  Family Medical History  Disease Name Relative/Age Notes   DM Type I Grandmother (paternal)/   Grandmother (paternal)   Breast Neoplasm Sister/46   --    Colon Neoplasm Father/33   --    Family History of Colon Cancer Father/   Father   Arthrtis Grandmother (paternal)/   Grandmother (paternal)         Social History  Finding Status Start/Stop Quantity Notes   Alcohol Former --/-- --  drank alcohol in the past, 7 or less drinks per week   Exercises regularly --  --/-- --  0 times per week   Recreational Drug Use Former --/-- --  in the past   Second hand smoke exposure Unknown --/-- --  yes   Tobacco Current every day --/-- 1.5 PPD current every day smoker, smokes 1 to 2 packs  "per day, for 25 years, never uses other tobacco products   Uses seatbelts --  --/-- --  yes         Immunizations  NameDate Admin Mfg Trade Name Lot Number Route Inj VIS Given VIS Publication   Tupidotni80/07/2020 [facility] FLUZONE [id number] IM  10/07/2020 08/15/2019   Comments: NDC#[id number]         Review of Systems  · Constitutional  o Admits  o : fatigue  o Denies  o : fever, chills, body aches  · Eyes  o Denies  o : discharge from eye  · HENT  o Admits  o : nasal congestion, nasal discharge, postnasal drip  o Denies  o : headaches, sinus pain, sore throat, tinnitus, ear pain, ear fullness, itchy eyes  · Cardiovascular  o Denies  o : chest pain, dyspnea on exertion, lower extremity edema, palpitations  · Respiratory  o Admits  o : wheezing, productive cough  o Denies  o : shortness of breath  · Gastrointestinal  o Denies  o : nausea, vomiting, diarrhea, constipation, abdominal pain, blood in stools, hematochezia, melena  · Genitourinary  o Denies  o : dysuria, urinary retention, difficulty voiding, urinary hesitancy, decreased stream, post-void dribbling  · Integument  o Admits  o : new skin lesions  · Neurologic  o Denies  o : dizziness  · Endocrine  o Denies  o : polyuria, polydipsia, cold intolerance, heat intolerance  · Psychiatric  o Admits  o : anxiety, depression  o Denies  o : difficulty sleeping, suicidal ideation, homicidal ideation      Vitals  Date Time BP Position Site L\R Cuff Size HR RR TEMP (F) WT  HT  BMI kg/m2 BSA m2 O2 Sat FR L/min FiO2        05/14/2021 08:36 /90 Sitting    118 - R  98.9 199lbs 0oz 5'  8\" 30.26 2.08 99 %            Physical Examination  · Constitutional  o Appearance  o : well-nourished, well developed, alert, in no acute distress, well-tended appearance, no obvious deformities present  · Head and Face  o HEENT  o : EAC and TMs WNL - OP pink and moist - mild drainage in the posterior OP - tonsils WNL  · Eyes  o Conjunctivae  o : conjunctivae normal, no exudates " present  · Neck  o Inspection/Palpation  o : normal appearance, no masses or tenderness, trachea midline  o Thyroid  o : no thyromegaly  · Respiratory  o Respiratory Effort  o : breathing unlabored  o Auscultation of Lungs  o : clear to auscultation on inhalation - there are expiratory wheezes noted on the left side, both anterior and posterior  · Cardiovascular  o Heart  o :   § Auscultation of Heart  § : regular rate, normal rhythm, no murmurs present  o Peripheral Vascular System  o :   § Carotid Arteries  § : normal pulses bilaterally, no bruits present  § Pedal Pulses  § : bilateral pulse strength 2+  § Extremities  § : no edema  · Lymphatic  o Neck  o : no lymphadenopathy present  · Musculoskeletal  o General  o :   § General Musculoskeletal  § : Muscle tone, strength, and development grossly normal.  · Skin and Subcutaneous Tissue  o General Inspection  o : on the lateral aspect of the right elbow, over the lateral epicondyle, there is a well-circumscribed nodule, approximately the size of a quarter - this is slightly erythemic and scaly.  · Neurologic  o Gait and Station  o :   § Gait Screening  § : normal gait  · Psychiatric  o Mood and Affect  o : mood normal, affect appropriate          Assessment  · Anemia     285.9/D64.9  · Anxiety disorder     300.00/F41.9  · Essential hypertension     401.9/I10  · Hyperlipidemia     272.4/E78.5  · Hypothyroidism     244.9/E03.9  · Nicotine dependence     305.1/F17.200  · Tobacco abuse counseling       Tobacco abuse counseling     V65.42/Z71.6  · Prostate cancer screening     V76.44/Z12.5  · Abnormal skin growth     239.2/D49.2  · Upper respiratory infection with cough and congestion     465.9/J06.9  · Colon cancer screening     V76.51/Z12.11      Plan  · Orders  o Smoking cessation counseling, 3-10 minutes [facility] (45714) - V65.42/Z71.6 - 05/14/2021  o ACO-17: Screened for tobacco use AND received tobacco cessation intervention (4004F) - V65.42/Z71.6 - 05/14/2021  o CBC  with Auto Diff [facility] (24604) - 285.9/D64.9, 401.9/I10, 272.4/E78.5 - 05/14/2021  o CMP [facility] (16395) - 401.9/I10, 272.4/E78.5 - 05/14/2021  o Lipid Panel [facility] (39750) - 401.9/I10, 272.4/E78.5 - 05/14/2021  o Thyroid Profile (THYII) - 244.9/E03.9 - 05/14/2021  o Urinalysis with Reflex Microscopy if abnormal ([facility]) (18079) - 401.9/I10 - 05/14/2021  o Urine microalbumin (81659) - 401.9/I10 - 05/14/2021  o ACO-18: Positive screen for clinical depression using a standardized tool and a follow-up plan documented () - - 05/14/2021  o ACO-39: Current medications updated and reviewed (1159F, ) - - 05/14/2021  o Thyroid antibody panel (90626) - 244.9/E03.9 - 05/14/2021  o Iron Profile (Iron 63706 TIBC 35893 and Transferrin 16181) (IRONP) - 285.9/D64.9 - 05/14/2021  o Ferritin ser/plas (52071) - 285.9/D64.9 - 05/14/2021  o B12 Folate levels (B12FO) - 285.9/D64.9 - 05/14/2021  o PSA Ultrasensitive, ANNUAL SCREENING [facility] (08828) - V76.44/Z12.5 - 05/14/2021  o DERMATOLOGY CONSULTATION (DERMA) - 239.2/D49.2 - 05/14/2021  o COLONOSCOPY REFERRAL (COLON) - V76.51/Z12.11 - 05/14/2021   wants to push this out about 3 months. BV  · Medications  o azithromycin 250 mg oral tablet   SIG: take 2 tablets (500 mg) by oral route once daily for 1 day then 1 tablet (250 mg) by oral route once daily for 4 days   DISP: (6) Tablet with 0 refills  Prescribed on 05/14/2021     o Medrol ([place]) 4 mg oral tablets,dose pack   SIG: take by oral route as directed per package instructions for 6 days   DISP: (1) Dose Pack with 0 refills  Prescribed on 05/14/2021     o Ventolin HFA 90 mcg/actuation inhalation HFA aerosol inhaler   SIG: inhale 1 - 2 puffs (90 - 180 mcg) by inhalation route every 6 hours as needed for 10 days   DISP: (1) Inhaler with 0 refills  Prescribed on 05/14/2021     o atorvastatin 20 mg oral tablet   SIG: take 1 tablet (20 mg) by oral route once daily at bedtime   DISP: (90) Tablet with 1 refills  Refilled on 05/14/2021      o escitalopram oxalate 20 mg oral tablet   SIG: take 1 tablet (20 mg) by oral route once daily   DISP: (90) Tablet with 1 refills  Refilled on 05/14/2021     o hydrochlorothiazide 25 mg oral tablet   SIG: take 1 tablet (25 mg) by oral route once daily   DISP: (90) Tablet with 1 refills  Refilled on 05/14/2021     o levothyroxine 88 mcg oral tablet   SIG: take 1 tablet (88 mcg) by oral route once daily   DISP: (90) Tablet with 1 refills  Refilled on 05/14/2021     o lisinopril 20 mg oral tablet   SIG: take 1 tablet by oral route 2 times a day   DISP: (180) Tablet with 1 refills  Refilled on 05/14/2021     o Medications have been Reconciled  o Transition of Care or Provider Policy  · Instructions  o Advised that cheeses and other sources of dairy fats, animal fats, fast food, and the extras (candy, pasteries, pies, doughnuts and cookies) all contain LDL raising nutrients. Advised to increase fruits, vegetables, whole grains, and to monitor portion sizes.   o *Form of nicotine being used: tobacco/cigarettes  o Patient was strongly encouraged to discontinue use of any nicotine containing product or minimize the use of the product.  o Tobacco and smoking cessation counseling for more than 3 minutes was completed.  o Take all medications as prescribed/directed.  o Patient was educated/instructed on their diagnosis, treatment and medications prior to discharge from the clinic today.  o Patient instructed to seek medical attention urgently for new or worsening symptoms.  o Call the office with any concerns or questions.  o Chronic conditions reviewed and taken into consideration for today's treatment plan.            Electronically Signed by: VIJAY Medina -Author on May 14, 2021 09:09:32 AM

## 2021-07-15 VITALS
TEMPERATURE: 98.9 F | HEART RATE: 118 BPM | BODY MASS INDEX: 30.16 KG/M2 | SYSTOLIC BLOOD PRESSURE: 160 MMHG | WEIGHT: 199 LBS | HEIGHT: 68 IN | OXYGEN SATURATION: 99 % | DIASTOLIC BLOOD PRESSURE: 90 MMHG

## 2021-08-20 ENCOUNTER — OFFICE VISIT (OUTPATIENT)
Dept: FAMILY MEDICINE CLINIC | Facility: CLINIC | Age: 56
End: 2021-08-20

## 2021-08-20 VITALS
DIASTOLIC BLOOD PRESSURE: 76 MMHG | OXYGEN SATURATION: 98 % | BODY MASS INDEX: 30.29 KG/M2 | SYSTOLIC BLOOD PRESSURE: 134 MMHG | WEIGHT: 193 LBS | TEMPERATURE: 96.4 F | HEIGHT: 67 IN | HEART RATE: 77 BPM

## 2021-08-20 DIAGNOSIS — Z83.49: ICD-10-CM

## 2021-08-20 DIAGNOSIS — E61.1 IRON DEFICIENCY: ICD-10-CM

## 2021-08-20 DIAGNOSIS — R53.83 FATIGUE, UNSPECIFIED TYPE: ICD-10-CM

## 2021-08-20 DIAGNOSIS — E03.9 HYPOTHYROIDISM, UNSPECIFIED TYPE: ICD-10-CM

## 2021-08-20 DIAGNOSIS — F41.9 ANXIETY: ICD-10-CM

## 2021-08-20 DIAGNOSIS — I10 ESSENTIAL HYPERTENSION: Primary | ICD-10-CM

## 2021-08-20 PROCEDURE — 99214 OFFICE O/P EST MOD 30 MIN: CPT | Performed by: NURSE PRACTITIONER

## 2021-08-20 RX ORDER — LISINOPRIL 20 MG/1
20 TABLET ORAL 2 TIMES DAILY
COMMUNITY
Start: 2021-05-14 | End: 2021-08-20 | Stop reason: SDUPTHER

## 2021-08-20 RX ORDER — LEVOTHYROXINE SODIUM 88 UG/1
88 TABLET ORAL DAILY
COMMUNITY
Start: 2021-05-14 | End: 2021-08-20 | Stop reason: SDUPTHER

## 2021-08-20 RX ORDER — LEVOTHYROXINE SODIUM 88 UG/1
88 TABLET ORAL DAILY
Qty: 90 TABLET | Refills: 1 | Status: SHIPPED | OUTPATIENT
Start: 2021-08-20 | End: 2022-02-14 | Stop reason: SDUPTHER

## 2021-08-20 RX ORDER — ESCITALOPRAM OXALATE 20 MG/1
20 TABLET ORAL DAILY
Qty: 90 TABLET | Refills: 1 | Status: SHIPPED | OUTPATIENT
Start: 2021-08-20 | End: 2022-02-18 | Stop reason: SDUPTHER

## 2021-08-20 RX ORDER — LISINOPRIL 20 MG/1
20 TABLET ORAL 2 TIMES DAILY
Qty: 180 TABLET | Refills: 1 | Status: SHIPPED | OUTPATIENT
Start: 2021-08-20 | End: 2022-02-18 | Stop reason: SDUPTHER

## 2021-08-20 RX ORDER — ESCITALOPRAM OXALATE 20 MG/1
20 TABLET ORAL DAILY
COMMUNITY
Start: 2021-05-14 | End: 2021-08-20 | Stop reason: SDUPTHER

## 2021-08-20 NOTE — PROGRESS NOTES
Chief Complaint  Hypertension (refills), Hypothyroidism (refills), and Anxiety (refills )    Subjective            Simon Anthony presents to [facility]  History of Present Illness     Follow-up on chronic health conditions and medication refills    Hypertension -he is currently taking lisinopril 20 mg twice daily.  Initial blood pressure was elevated; however, he attributed that to fatigue.  States he has been out of his thyroid medicine for the past 2 to 3 days, and he just got off work.  Continues to work at [employer] third shift.  He denies any chest pain or palpitations.  Denies any headaches, dizziness, or shortness of breath.  No swelling in the legs.  No complaints of cough with use of lisinopril.    Hypothyroidism with a history of persistently elevated thyroid antibodies and abnormal thyroid ultrasound.  He has not established care with endocrinology despite referrals to do so.  His last thyroid ultrasound was in 2019 with recommendations to repeat in 12 months; however, due to insurance issues he has not had a repeat ultrasound to date.  He denies any dysphagia.  He has chronic hoarseness which he says is unchanged.  Denies any odynophagia.    Anxiety, currently prescribed Lexapro.  He reports his symptoms are stable.  Denies any depression.  Denies homicidal ideations or suicidal ideations.  Denies any AVH.    PHQ-2 Total Score: 0      Past Medical History:   Diagnosis Date   • Anemia    • Anxiety    • Hypertension    • Hypothyroidism        No Known Allergies     History reviewed. No pertinent surgical history.     Social History     Tobacco Use   • Smoking status: Current Every Day Smoker     Packs/day: 1.50     Years: 25.00     Pack years: 37.50     Types: Cigarettes   • Smokeless tobacco: Never Used   Substance Use Topics   • Alcohol use: Not Currently     Comment: Usedto drink. 7 or less drinks per week   • Drug use: Not Currently     Comment: Former       Family  "History   Problem Relation Age of Onset   • Colon cancer Father    • Other Father         Colon Neoplasm   • Other Sister         Breast Neoplasm   • Diabetes type I Paternal Grandmother    • Arthritis Paternal Grandmother         Health Maintenance Due   Topic Date Due   • COLORECTAL CANCER SCREENING  Never done   • ANNUAL PHYSICAL  Never done   • Pneumococcal Vaccine 0-64 (1 of 2 - PPSV23) Never done   • COVID-19 Vaccine (1) Never done   • LUNG CANCER SCREENING  Never done        Current Outpatient Medications on File Prior to Visit   Medication Sig   • [DISCONTINUED] escitalopram (LEXAPRO) 20 MG tablet Take 20 mg by mouth Daily.   • [DISCONTINUED] Euthyrox 88 MCG tablet Take 88 mcg by mouth Daily.   • [DISCONTINUED] lisinopril (PRINIVIL,ZESTRIL) 20 MG tablet Take 20 mg by mouth 2 (Two) Times a Day.     No current facility-administered medications on file prior to visit.       Immunization History   Administered Date(s) Administered   • Hepatitis A 06/18/2018   • Influenza, Unspecified 10/21/2019, 10/07/2020   • Shingrix 03/10/2018, 06/18/2018   • Tdap 12/01/2016       Review of Systems     Objective     /76 (BP Location: Right arm, Patient Position: Sitting, Cuff Size: Adult)   Pulse 77   Temp 96.4 °F (35.8 °C)   Ht 170.2 cm (67\")   Wt 87.5 kg (193 lb)   SpO2 98%   BMI 30.23 kg/m²       Physical Exam  Vitals reviewed.   Constitutional:       General: He is not in acute distress.     Appearance: Normal appearance. He is well-developed. He is obese.   HENT:      Head: Normocephalic and atraumatic.   Eyes:      General: No scleral icterus.     Conjunctiva/sclera: Conjunctivae normal.      Pupils: Pupils are equal, round, and reactive to light.   Neck:      Thyroid: No thyroid mass, thyromegaly or thyroid tenderness.      Vascular: No carotid bruit.      Trachea: Trachea normal.   Cardiovascular:      Rate and Rhythm: Normal rate and regular rhythm.      Pulses: Normal pulses.      Heart sounds: No murmur " heard.     Pulmonary:      Effort: Pulmonary effort is normal.      Breath sounds: Normal breath sounds. No wheezing or rhonchi.   Musculoskeletal:         General: Normal range of motion.      Cervical back: Normal range of motion and neck supple.      Right lower leg: No edema.      Left lower leg: No edema.   Lymphadenopathy:      Cervical: No cervical adenopathy.   Skin:     General: Skin is warm and dry.      Findings: Lesion (right elbow) present.   Neurological:      Mental Status: He is alert and oriented to person, place, and time.   Psychiatric:         Mood and Affect: Mood and affect normal.         Behavior: Behavior normal.         Thought Content: Thought content normal.         Judgment: Judgment normal.         Result Review :     The following data was reviewed by: VIJAY Roldan on 08/20/2021:    Common labs    Common Labsle 10/7/20 10/7/20 10/7/20 5/14/21 5/14/21 5/14/21 5/14/21    0824 0824 0824 0901 0901 0901 0901   Glucose  98    98    BUN  24    12    Creatinine  1.28 (A)    1.28 (A)    Sodium  137    138    Potassium  3.6    4.3    Chloride  98 (A)    99    Calcium  9.3    9.4    Albumin  4.5    4.7    Total Bilirubin  0.18 (A)    0.26    Alkaline Phosphatase  79    72    AST (SGOT)  21    21    ALT (SGPT)  15    19    WBC 7.96   10.09      Hemoglobin 11.4 (A)   12.1 (A)      Hematocrit 34.0 (A)   36.9 (A)      Platelets 382   362      Total Cholesterol   202 (A)    213 (A)   Triglycerides   397 (A)    226 (A)   HDL Cholesterol   30 (A)    39 (A)   LDL Cholesterol    93    129 (A)   PSA     0.61     (A) Abnormal value       Comments are available for some flowsheets but are not being displayed.           PSA    PSA 5/14/21   PSA 0.61             Data reviewed: Thyroid US 2019 - abnormal  1. Mildly heterogeneous thyroid parenchyma could reflect thyroiditis or sequela from thyroiditis.    2. There is a 1.3 centimeter isoechoic solid isthmus nodule and a 1.2 centimeter hyperechoic  solid     left thyroid nodule.  These are probably T rads 3 nodules.  Recommend continued follow-up in 12     months.    3. Bilateral lymph nodes are noted the largest on the left measuring up to about 1.2 centimeters     favor reactive.               Assessment and Plan      Diagnoses and all orders for this visit:    1. Essential hypertension (Primary)  -     lisinopril (PRINIVIL,ZESTRIL) 20 MG tablet; Take 1 tablet by mouth 2 (Two) Times a Day.  Dispense: 180 tablet; Refill: 1  -     CBC Auto Differential  -     Comprehensive Metabolic Panel  -     Lipid Panel    2. Family history of non-anemic folate deficiency  -     Vitamin B12 & Folate    3. Hypothyroidism, unspecified type  -     Euthyrox 88 MCG tablet; Take 1 tablet by mouth Daily.  Dispense: 90 tablet; Refill: 1  -     TSH+Free T4  -     Thyroid Peroxidase Antibody; Future    4. Anxiety  -     escitalopram (LEXAPRO) 20 MG tablet; Take 1 tablet by mouth Daily.  Dispense: 90 tablet; Refill: 1    5. Iron deficiency  -     Ferritin  -     Iron Profile    6. Fatigue, unspecified type  -     Ferritin  -     Iron Profile  -     Vitamin B12 & Folate            Follow Up     No follow-ups on file.     He will RTC next week for labs. States he is not fasting today. He is hoping to get his insurance reinstated next week. He has been without insurance d/t missing open enrollment.     We have discussed the need for endocrinology consultation - he has a hx of abnl thyroid US, as well as elevated thyroid antibodies. He declines repeat US today, or referral to endocrinology, due to lack of insurance at this time.     Additionally, patient was previously referred to dermatology for the lesion on his right elbow; however, again, due to lack of insurance he has not had follow-up for this to date.  Once he gets his insurance reinstated he will let me know and we will get his referrals and imaging arranged for him.    Patient was given instructions and counseling regarding his  condition or for health maintenance advice. Please see specific information pulled into the AVS if appropriate.     Simon REID Raj  reports that he has been smoking cigarettes. He has a 37.50 pack-year smoking history. He has never used smokeless tobacco.. I have educated him on the risk of diseases from using tobacco products such as cancer, COPD and heart disease.     I advised him to quit and he is not willing to quit.

## 2021-10-09 ENCOUNTER — APPOINTMENT (OUTPATIENT)
Dept: GENERAL RADIOLOGY | Facility: HOSPITAL | Age: 56
End: 2021-10-09

## 2021-10-09 ENCOUNTER — HOSPITAL ENCOUNTER (EMERGENCY)
Facility: HOSPITAL | Age: 56
Discharge: HOME | End: 2021-10-10
Attending: EMERGENCY MEDICINE | Admitting: EMERGENCY MEDICINE

## 2021-10-09 DIAGNOSIS — R07.9 CHEST PAIN, UNSPECIFIED TYPE: Primary | ICD-10-CM

## 2021-10-09 LAB
ALBUMIN SERPL-MCNC: 4.1 G/DL (ref 3.5–5.2)
ALBUMIN/GLOB SERPL: 1.4 G/DL
ALP SERPL-CCNC: 79 U/L (ref 39–117)
ALT SERPL W P-5'-P-CCNC: 17 U/L (ref 1–41)
ANION GAP SERPL CALCULATED.3IONS-SCNC: 14.8 MMOL/L (ref 5–15)
AST SERPL-CCNC: 18 U/L (ref 1–40)
BASOPHILS # BLD AUTO: 0.04 10*3/MM3 (ref 0–0.2)
BASOPHILS NFR BLD AUTO: 0.6 % (ref 0–1.5)
BILIRUB SERPL-MCNC: 0.2 MG/DL (ref 0–1.2)
BUN SERPL-MCNC: 8 MG/DL (ref 6–20)
BUN/CREAT SERPL: 7.6 (ref 7–25)
CALCIUM SPEC-SCNC: 8.8 MG/DL (ref 8.6–10.5)
CHLORIDE SERPL-SCNC: 102 MMOL/L (ref 98–107)
CK MB SERPL-CCNC: 1.98 NG/ML
CK SERPL-CCNC: 169 U/L (ref 20–200)
CO2 SERPL-SCNC: 21.2 MMOL/L (ref 22–29)
CREAT SERPL-MCNC: 1.05 MG/DL (ref 0.76–1.27)
DEPRECATED RDW RBC AUTO: 47.1 FL (ref 37–54)
EOSINOPHIL # BLD AUTO: 0.23 10*3/MM3 (ref 0–0.4)
EOSINOPHIL NFR BLD AUTO: 3.3 % (ref 0.3–6.2)
ERYTHROCYTE [DISTWIDTH] IN BLOOD BY AUTOMATED COUNT: 14.6 % (ref 12.3–15.4)
GFR SERPL CREATININE-BSD FRML MDRD: 73 ML/MIN/1.73
GLOBULIN UR ELPH-MCNC: 3 GM/DL
GLUCOSE SERPL-MCNC: 143 MG/DL (ref 65–99)
HCT VFR BLD AUTO: 38.5 % (ref 37.5–51)
HGB BLD-MCNC: 13 G/DL (ref 13–17.7)
HOLD SPECIMEN: NORMAL
HOLD SPECIMEN: NORMAL
IMM GRANULOCYTES # BLD AUTO: 0.01 10*3/MM3 (ref 0–0.05)
IMM GRANULOCYTES NFR BLD AUTO: 0.1 % (ref 0–0.5)
LIPASE SERPL-CCNC: 35 U/L (ref 13–60)
LYMPHOCYTES # BLD AUTO: 2.39 10*3/MM3 (ref 0.7–3.1)
LYMPHOCYTES NFR BLD AUTO: 34.2 % (ref 19.6–45.3)
MAGNESIUM SERPL-MCNC: 2.1 MG/DL (ref 1.6–2.6)
MCH RBC QN AUTO: 29.9 PG (ref 26.6–33)
MCHC RBC AUTO-ENTMCNC: 33.8 G/DL (ref 31.5–35.7)
MCV RBC AUTO: 88.5 FL (ref 79–97)
MONOCYTES # BLD AUTO: 0.51 10*3/MM3 (ref 0.1–0.9)
MONOCYTES NFR BLD AUTO: 7.3 % (ref 5–12)
NEUTROPHILS NFR BLD AUTO: 3.81 10*3/MM3 (ref 1.7–7)
NEUTROPHILS NFR BLD AUTO: 54.5 % (ref 42.7–76)
NRBC BLD AUTO-RTO: 0 /100 WBC (ref 0–0.2)
NT-PROBNP SERPL-MCNC: 98.8 PG/ML (ref 0–900)
PLATELET # BLD AUTO: 297 10*3/MM3 (ref 140–450)
PMV BLD AUTO: 10.2 FL (ref 6–12)
POTASSIUM SERPL-SCNC: 4 MMOL/L (ref 3.5–5.2)
PROT SERPL-MCNC: 7.1 G/DL (ref 6–8.5)
RBC # BLD AUTO: 4.35 10*6/MM3 (ref 4.14–5.8)
SODIUM SERPL-SCNC: 138 MMOL/L (ref 136–145)
TROPONIN I SERPL-MCNC: 0.01 NG/ML (ref 0–0.6)
TROPONIN I SERPL-MCNC: 0.03 NG/ML (ref 0–0.6)
WBC # BLD AUTO: 6.99 10*3/MM3 (ref 3.4–10.8)
WHOLE BLOOD HOLD SPECIMEN: NORMAL
WHOLE BLOOD HOLD SPECIMEN: NORMAL

## 2021-10-09 PROCEDURE — 80053 COMPREHEN METABOLIC PANEL: CPT

## 2021-10-09 PROCEDURE — 85025 COMPLETE CBC W/AUTO DIFF WBC: CPT

## 2021-10-09 PROCEDURE — 93010 ELECTROCARDIOGRAM REPORT: CPT | Performed by: INTERNAL MEDICINE

## 2021-10-09 PROCEDURE — 71045 X-RAY EXAM CHEST 1 VIEW: CPT

## 2021-10-09 PROCEDURE — 93005 ELECTROCARDIOGRAM TRACING: CPT

## 2021-10-09 PROCEDURE — 83735 ASSAY OF MAGNESIUM: CPT

## 2021-10-09 PROCEDURE — 83690 ASSAY OF LIPASE: CPT

## 2021-10-09 PROCEDURE — 84484 ASSAY OF TROPONIN QUANT: CPT

## 2021-10-09 PROCEDURE — 83880 ASSAY OF NATRIURETIC PEPTIDE: CPT

## 2021-10-09 PROCEDURE — 82553 CREATINE MB FRACTION: CPT

## 2021-10-09 PROCEDURE — 99284 EMERGENCY DEPT VISIT MOD MDM: CPT

## 2021-10-09 PROCEDURE — 82550 ASSAY OF CK (CPK): CPT

## 2021-10-09 PROCEDURE — 93005 ELECTROCARDIOGRAM TRACING: CPT | Performed by: EMERGENCY MEDICINE

## 2021-10-09 RX ORDER — FERROUS SULFATE TAB EC 324 MG (65 MG FE EQUIVALENT) 324 (65 FE) MG
324 TABLET DELAYED RESPONSE ORAL
COMMUNITY
End: 2022-02-18

## 2021-10-09 RX ORDER — SODIUM CHLORIDE 0.9 % (FLUSH) 0.9 %
10 SYRINGE (ML) INJECTION AS NEEDED
Status: DISCONTINUED | OUTPATIENT
Start: 2021-10-09 | End: 2021-10-10 | Stop reason: HOSPADM

## 2021-10-10 VITALS
OXYGEN SATURATION: 96 % | HEART RATE: 67 BPM | RESPIRATION RATE: 16 BRPM | HEIGHT: 69 IN | SYSTOLIC BLOOD PRESSURE: 144 MMHG | TEMPERATURE: 97.9 F | DIASTOLIC BLOOD PRESSURE: 79 MMHG | BODY MASS INDEX: 29.04 KG/M2 | WEIGHT: 196.1 LBS

## 2021-10-10 RX ORDER — OMEPRAZOLE 40 MG/1
40 CAPSULE, DELAYED RELEASE ORAL DAILY
Qty: 14 CAPSULE | Refills: 0 | Status: SHIPPED | OUTPATIENT
Start: 2021-10-10 | End: 2022-02-18

## 2021-10-10 NOTE — ED PROVIDER NOTES
Time: 10:15 PM EDT  Arrived by: ambulance  Chief Complaint: left arm pain  History provided by: patient  History is limited by: N/A     History of Present Illness:  Patient is a 56 y.o. year old male that presents to the emergency department with left shoulder pain that radiated to his arm and across his chest while he was walking the dogs within the last 3-4 hours. Pt did have nausea with the pain. Pt notes pain lasted about an hour and is now resolved. Pt denies any other complaints. Pt has a chronic cough that is unchanged secondary to smoking.     Pt is on Lisinopril every 12 hours.       Arm Pain  Location:  Left shoulder  Severity:  Mild  Onset quality:  Sudden  Duration:  1 hour  Timing:  Constant  Progression:  Resolved  Chronicity:  New  Context:  Radiating down the arm and across the chest  Relieved by:  Nothing  Worsened by:  Nothing  Associated symptoms: chest pain and nausea    Associated symptoms: no diarrhea, no rash and no shortness of breath        Similar Symptoms Previously: no  Recently seen: yes      Patient Care Team  Primary Care Provider: Soheila Reyez APRN    Past Medical History:     No Known Allergies  Past Medical History:   Diagnosis Date   • Anemia    • Anxiety    • Hypertension    • Hypothyroidism      History reviewed. No pertinent surgical history.  Family History   Problem Relation Age of Onset   • Colon cancer Father    • Other Father         Colon Neoplasm   • Other Sister         Breast Neoplasm   • Diabetes type I Paternal Grandmother    • Arthritis Paternal Grandmother        Home Medications:  Prior to Admission medications    Medication Sig Start Date End Date Taking? Authorizing Provider   ferrous sulfate 324 (65 Fe) MG tablet delayed-release EC tablet Take 324 mg by mouth Daily With Breakfast.   Yes Provider, MD Odalis   escitalopram (LEXAPRO) 20 MG tablet Take 1 tablet by mouth Daily. 8/20/21   Soheila Reyez APRN   Euthyrox 88 MCG tablet Take 1 tablet  "by mouth Daily. 8/20/21   Soheila Reyez APRN   lisinopril (PRINIVIL,ZESTRIL) 20 MG tablet Take 1 tablet by mouth 2 (Two) Times a Day. 8/20/21   Soheila Reyez APRN        Social History:   Social History     Tobacco Use   • Smoking status: Current Every Day Smoker     Packs/day: 2.00     Years: 25.00     Pack years: 50.00     Types: Cigarettes   • Smokeless tobacco: Never Used   Substance Use Topics   • Alcohol use: Not Currently   • Drug use: Not Currently     Comment: Former     Recent travel: not applicable     Review of Systems:  Review of Systems   Constitutional: Negative for chills.   HENT: Negative for nosebleeds.    Eyes: Negative for redness.   Respiratory: Negative for shortness of breath.    Cardiovascular: Positive for chest pain.   Gastrointestinal: Positive for nausea. Negative for diarrhea.   Genitourinary: Negative for dysuria and frequency.   Musculoskeletal: Negative for neck pain.   Skin: Negative for rash.   Neurological: Negative for seizures.        Physical Exam:  /79   Pulse 61   Temp 97.9 °F (36.6 °C) (Oral)   Resp 16   Ht 175.3 cm (69\")   Wt 89 kg (196 lb 1.6 oz)   SpO2 96%   BMI 28.96 kg/m²     Physical Exam  Vitals and nursing note reviewed.   Constitutional:       General: He is not in acute distress.     Appearance: Normal appearance. He is not toxic-appearing.   HENT:      Head: Normocephalic and atraumatic.      Nose: Nose normal.      Mouth/Throat:      Mouth: Mucous membranes are moist.   Eyes:      General: Lids are normal. No scleral icterus.     Conjunctiva/sclera: Conjunctivae normal.   Cardiovascular:      Rate and Rhythm: Normal rate and regular rhythm.      Pulses: Normal pulses.      Heart sounds: Normal heart sounds. No murmur heard.      Pulmonary:      Effort: Pulmonary effort is normal. No respiratory distress.      Breath sounds: Normal breath sounds and air entry. No decreased air movement. No decreased breath sounds, wheezing, rhonchi or " rales.   Chest:      Chest wall: No tenderness.   Abdominal:      Palpations: Abdomen is soft.      Tenderness: There is no abdominal tenderness. There is no guarding or rebound.   Musculoskeletal:         General: No tenderness. Normal range of motion.      Cervical back: Normal range of motion and neck supple. No tenderness.      Right lower leg: No edema.      Left lower leg: No edema.   Skin:     General: Skin is warm and dry.      Findings: No rash.   Neurological:      Mental Status: He is alert and oriented to person, place, and time.      Sensory: Sensation is intact.      Motor: Motor function is intact.   Psychiatric:         Behavior: Behavior normal.                Medications in the Emergency Department:  Medications   sodium chloride 0.9 % flush 10 mL (has no administration in time range)        Labs  Lab Results (last 24 hours)     Procedure Component Value Units Date/Time    POC Troponin I [[id number]]  (Normal) Collected: 10/09/21 2122    Specimen: Blood Updated: 10/09/21 2135     Troponin I 0.01 ng/mL      Comment: Serial Number: [id number]:  [id number]       CBC & Differential [[id number]]  (Normal) Collected: 10/09/21 2124    Specimen: Blood Updated: 10/09/21 2136    Narrative:      The following orders were created for panel order CBC & Differential.  Procedure                               Abnormality         Status                     ---------                               -----------         ------                     CBC Auto Differential[[id number]]        Normal              Final result                 Please view results for these tests on the individual orders.    Comprehensive Metabolic Panel [[id number]]  (Abnormal) Collected: 10/09/21 2124    Specimen: Blood Updated: 10/09/21 2155     Glucose 143 mg/dL      BUN 8 mg/dL      Creatinine 1.05 mg/dL      Sodium 138 mmol/L      Potassium 4.0 mmol/L      Chloride 102 mmol/L      CO2 21.2 mmol/L      Calcium 8.8 mg/dL      Total Protein 7.1  g/dL      Albumin 4.10 g/dL      ALT (SGPT) 17 U/L      AST (SGOT) 18 U/L      Alkaline Phosphatase 79 U/L      Total Bilirubin 0.2 mg/dL      eGFR Non African Amer 73 mL/min/1.73      Globulin 3.0 gm/dL      A/G Ratio 1.4 g/dL      BUN/Creatinine Ratio 7.6     Anion Gap 14.8 mmol/L     Narrative:      GFR Normal >60  Chronic Kidney Disease <60  Kidney Failure <15      Lipase [[id number]]  (Normal) Collected: 10/09/21 2124    Specimen: Blood Updated: 10/09/21 2155     Lipase 35 U/L     BNP [[id number]]  (Normal) Collected: 10/09/21 2124    Specimen: Blood Updated: 10/09/21 2153     proBNP 98.8 pg/mL     Narrative:      Among patients with dyspnea, NT-proBNP is highly sensitive for the detection of acute congestive heart failure. In addition NT-proBNP of <300 pg/ml effectively rules out acute congestive heart failure with 99% negative predictive value.    Results may be falsely decreased if patient taking Biotin.      Magnesium [[id number]]  (Normal) Collected: 10/09/21 2124    Specimen: Blood Updated: 10/09/21 2155     Magnesium 2.1 mg/dL     CK Total & CKMB [[id number]]  (Normal) Collected: 10/09/21 2124    Specimen: Blood Updated: 10/09/21 2155     CKMB 1.98 ng/mL      Creatine Kinase 169 U/L     Narrative:      CKMB results may be falsely decreased if patient taking Biotin.    CBC Auto Differential [[id number]]  (Normal) Collected: 10/09/21 2124    Specimen: Blood Updated: 10/09/21 2136     WBC 6.99 10*3/mm3      RBC 4.35 10*6/mm3      Hemoglobin 13.0 g/dL      Hematocrit 38.5 %      MCV 88.5 fL      MCH 29.9 pg      MCHC 33.8 g/dL      RDW 14.6 %      RDW-SD 47.1 fl      MPV 10.2 fL      Platelets 297 10*3/mm3      Neutrophil % 54.5 %      Lymphocyte % 34.2 %      Monocyte % 7.3 %      Eosinophil % 3.3 %      Basophil % 0.6 %      Immature Grans % 0.1 %      Neutrophils, Absolute 3.81 10*3/mm3      Lymphocytes, Absolute 2.39 10*3/mm3      Monocytes, Absolute 0.51 10*3/mm3      Eosinophils, Absolute 0.23 10*3/mm3       Basophils, Absolute 0.04 10*3/mm3      Immature Grans, Absolute 0.01 10*3/mm3      nRBC 0.0 /100 WBC     POC Troponin I [[id number]]  (Normal) Collected: 10/09/21 2316    Specimen: Blood Updated: 10/09/21 2329     Troponin I 0.03 ng/mL      Comment: Serial Number: [id number]:  [id number]              Imaging:  XR Chest 1 View    Result Date: 10/9/2021  PROCEDURE: XR CHEST 1 VW  COMPARISON: [facility], , CHEST PA/AP & LAT 2V, 10/21/2019, 11:39.  INDICATIONS: CHEST PAIN WITH ELEVATED BLOOD PRESSURE  FINDINGS:  The cardiomediastinal silhouette is within normal limits. The lungs are clear. There is no focal consolidation, pneumothorax or large pleural effusion.   CONCLUSION:  No acute process.       JERI TURPIN MD       Electronically Signed and Approved By: JERI TURPIN MD on 10/09/2021 at 21:56               Procedures:  Procedures    Progress  ED Course as of 10/10/21 0130   Sat Oct 09, 2021   2224 EKG interpretation: Normal sinus rhythm, rate 77, normal QRS, normal axis, normal OH interval, normal ST segments with no acute ischemia. [RP]      ED Course User Index  [RP] José Miguel Nugent MD           HEART Score: 3                  Medical Decision Making:  MDM  Number of Diagnoses or Management Options     Amount and/or Complexity of Data Reviewed  Clinical lab tests: reviewed  Tests in the radiology section of CPT®: reviewed  Tests in the medicine section of CPT®: reviewed  Independent visualization of images, tracings, or specimens: yes    Risk of Complications, Morbidity, and/or Mortality  Presenting problems: moderate  Management options: low    Patient Progress  Patient progress: improved       Final diagnoses:   Chest pain, unspecified type        Disposition:  ED Disposition     ED Disposition Condition Comment    Discharge Stable           This medical record created using voice recognition software and may contain unintended errors.    Documentation assistance provided by Aisha  Eunice acting as scribe for José Miguel Nugent MD. Information recorded by the scribe was done at my direction and has been verified and validated by me.          Aisha Dawson  10/09/21 2234       Aisha Dawson  10/09/21 2235       Aisha Dawson  10/09/21 2236       José Miguel Nugent MD  10/10/21 0129       José Miguel Nugent MD  10/10/21 0130       José Miguel Nugent MD  10/10/21 0130

## 2021-10-31 LAB
QT INTERVAL: 384 MS
QT INTERVAL: 414 MS

## 2022-02-14 DIAGNOSIS — E03.9 HYPOTHYROIDISM, UNSPECIFIED TYPE: ICD-10-CM

## 2022-02-14 RX ORDER — LEVOTHYROXINE SODIUM 88 UG/1
88 TABLET ORAL DAILY
Qty: 30 TABLET | Refills: 0 | Status: SHIPPED | OUTPATIENT
Start: 2022-02-14 | End: 2022-02-21 | Stop reason: DRUGHIGH

## 2022-02-14 NOTE — TELEPHONE ENCOUNTER
Caller: Simon Anthony    Relationship: Self    Best call back number: [phone]    Requested Prescriptions:   Requested Prescriptions     Pending Prescriptions Disp Refills   • Euthyrox 88 MCG tablet 90 tablet 1     Sig: Take 1 tablet by mouth Daily.        Pharmacy where request should be sent: [address] [phone] [facility] [phone] FX     Additional details provided by patient: THE PATIENT HAS RUN OUT OF MEDICATION AND WOULD LIKE A 7 DAY SUPPLY SENT TO THE PHARMACY [facility]. HE HAS SCHEDULED AN APPOINTMENT WITH PCP    Does the patient have less than a 3 day supply:  [x] Yes  [] No    vIan Bourne Rep   02/14/22 09:51 EST

## 2022-02-18 ENCOUNTER — OFFICE VISIT (OUTPATIENT)
Dept: FAMILY MEDICINE CLINIC | Facility: CLINIC | Age: 57
End: 2022-02-18

## 2022-02-18 VITALS
BODY MASS INDEX: 29.18 KG/M2 | HEART RATE: 91 BPM | OXYGEN SATURATION: 98 % | SYSTOLIC BLOOD PRESSURE: 134 MMHG | TEMPERATURE: 97.5 F | WEIGHT: 197 LBS | DIASTOLIC BLOOD PRESSURE: 82 MMHG | HEIGHT: 69 IN

## 2022-02-18 DIAGNOSIS — L98.9 CHANGING SKIN LESION: ICD-10-CM

## 2022-02-18 DIAGNOSIS — F17.219 CIGARETTE NICOTINE DEPENDENCE WITH NICOTINE-INDUCED DISORDER: ICD-10-CM

## 2022-02-18 DIAGNOSIS — E04.1 THYROID NODULE: ICD-10-CM

## 2022-02-18 DIAGNOSIS — Z12.11 ENCOUNTER FOR SCREENING FOR MALIGNANT NEOPLASM OF COLON: ICD-10-CM

## 2022-02-18 DIAGNOSIS — I10 ESSENTIAL HYPERTENSION: Primary | ICD-10-CM

## 2022-02-18 DIAGNOSIS — E03.9 HYPOTHYROIDISM, UNSPECIFIED TYPE: ICD-10-CM

## 2022-02-18 DIAGNOSIS — F41.9 ANXIETY: ICD-10-CM

## 2022-02-18 LAB
BASOPHILS # BLD AUTO: 0.03 10*3/MM3 (ref 0–0.2)
BASOPHILS NFR BLD AUTO: 0.4 % (ref 0–1.5)
DEPRECATED RDW RBC AUTO: 42.6 FL (ref 37–54)
EOSINOPHIL # BLD AUTO: 0.34 10*3/MM3 (ref 0–0.4)
EOSINOPHIL NFR BLD AUTO: 4.7 % (ref 0.3–6.2)
ERYTHROCYTE [DISTWIDTH] IN BLOOD BY AUTOMATED COUNT: 13.2 % (ref 12.3–15.4)
FOLATE SERPL-MCNC: 10.2 NG/ML (ref 4.78–24.2)
HCT VFR BLD AUTO: 37.4 % (ref 37.5–51)
HGB BLD-MCNC: 12.5 G/DL (ref 13–17.7)
IMM GRANULOCYTES # BLD AUTO: 0.01 10*3/MM3 (ref 0–0.05)
IMM GRANULOCYTES NFR BLD AUTO: 0.1 % (ref 0–0.5)
LYMPHOCYTES # BLD AUTO: 2.89 10*3/MM3 (ref 0.7–3.1)
LYMPHOCYTES NFR BLD AUTO: 40.3 % (ref 19.6–45.3)
MCH RBC QN AUTO: 29.7 PG (ref 26.6–33)
MCHC RBC AUTO-ENTMCNC: 33.4 G/DL (ref 31.5–35.7)
MCV RBC AUTO: 88.8 FL (ref 79–97)
MONOCYTES # BLD AUTO: 0.6 10*3/MM3 (ref 0.1–0.9)
MONOCYTES NFR BLD AUTO: 8.4 % (ref 5–12)
NEUTROPHILS NFR BLD AUTO: 3.3 10*3/MM3 (ref 1.7–7)
NEUTROPHILS NFR BLD AUTO: 46.1 % (ref 42.7–76)
NRBC BLD AUTO-RTO: 0 /100 WBC (ref 0–0.2)
PLATELET # BLD AUTO: 363 10*3/MM3 (ref 140–450)
PMV BLD AUTO: 10.4 FL (ref 6–12)
RBC # BLD AUTO: 4.21 10*6/MM3 (ref 4.14–5.8)
VIT B12 BLD-MCNC: 627 PG/ML (ref 211–946)
WBC NRBC COR # BLD: 7.17 10*3/MM3 (ref 3.4–10.8)

## 2022-02-18 PROCEDURE — 82746 ASSAY OF FOLIC ACID SERUM: CPT | Performed by: NURSE PRACTITIONER

## 2022-02-18 PROCEDURE — 80050 GENERAL HEALTH PANEL: CPT | Performed by: NURSE PRACTITIONER

## 2022-02-18 PROCEDURE — 83540 ASSAY OF IRON: CPT | Performed by: NURSE PRACTITIONER

## 2022-02-18 PROCEDURE — 84466 ASSAY OF TRANSFERRIN: CPT | Performed by: NURSE PRACTITIONER

## 2022-02-18 PROCEDURE — 99214 OFFICE O/P EST MOD 30 MIN: CPT | Performed by: NURSE PRACTITIONER

## 2022-02-18 PROCEDURE — 82607 VITAMIN B-12: CPT | Performed by: NURSE PRACTITIONER

## 2022-02-18 PROCEDURE — 84439 ASSAY OF FREE THYROXINE: CPT | Performed by: NURSE PRACTITIONER

## 2022-02-18 PROCEDURE — 82570 ASSAY OF URINE CREATININE: CPT | Performed by: NURSE PRACTITIONER

## 2022-02-18 PROCEDURE — 82728 ASSAY OF FERRITIN: CPT | Performed by: NURSE PRACTITIONER

## 2022-02-18 PROCEDURE — 82043 UR ALBUMIN QUANTITATIVE: CPT | Performed by: NURSE PRACTITIONER

## 2022-02-18 RX ORDER — LISINOPRIL 20 MG/1
20 TABLET ORAL 2 TIMES DAILY
Qty: 180 TABLET | Refills: 1 | Status: SHIPPED | OUTPATIENT
Start: 2022-02-18 | End: 2022-08-31

## 2022-02-18 RX ORDER — ESCITALOPRAM OXALATE 20 MG/1
20 TABLET ORAL DAILY
Qty: 90 TABLET | Refills: 1 | Status: SHIPPED | OUTPATIENT
Start: 2022-02-18 | End: 2022-08-24

## 2022-02-18 RX ORDER — BUSPIRONE HYDROCHLORIDE 5 MG/1
5 TABLET ORAL 2 TIMES DAILY
Qty: 60 TABLET | Refills: 0 | Status: SHIPPED | OUTPATIENT
Start: 2022-02-18 | End: 2022-03-18 | Stop reason: SDUPTHER

## 2022-02-18 NOTE — PROGRESS NOTES
Chief Complaint  Hypertension (REFILLS)    Subjective            Simon Anthony presents to [facility]  History of Present Illness     Follow up on chronic health conditions and medication refills.     Essential hypertension - BP controlled on exam today - 134/82. He did have an incident back in October where he called EMS and had them take him to the ER - had chest pain and SOB and his BP was elevated - he was in the ER for 4 hours and they ruled out MI. It cost him $1200 for the ambulance alone. Heart attack was ruled out and ultimately he had just forgotten to take his medication. He has not had any recurrence of chest pain, palpitations, headaches, or dizziness. No shortness of breath.     He is prescribed Lexpro for treatment of anxiety - he states that he doesn't really know if it helps or not - or if it has quit working.  He has been taking it for a while now.  He does tend to feel more on edge or angry at work. He would like to be a little bit calmer. 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 He denies any HI/SI. No AVH.    Hypothyroidism - he is taking levothyroxine daily - he denies any change in hair or nails. He had an US in the past which showed nodules, but we have not had follow up since - that was in 2019 - mainly due to financial constraints.     He has a history of anemia, but his most recent CBC in the ER was normal. He did have an occurrence of black stools sometime after the ER visit, but it was short-lived and has not recurred. He currently denies any constipation, diarrhea, abdominal pain, rectal bleeding, or melena.  He denies any heartburn or acid reflux.  Denies nausea, vomiting, or unexpected weight loss.  He has a history of GERD, but has not had to take a PPI in quite some time.    He is still smoking - he smokes about 1.5 PPD for the past 20 years - he does have a 'smokers cough' - but no wheezing or abnormal sputum production.  He is agreeable to CT lung cancer screening.     He needs a referral to a dermatologist now that he has insurance - he has a spot on the RUE - has been there for 10 months - not really any bigger than it has been, but when it initially presented it was rapidly changing. First noted in the office in May 2021.    PHQ-2 Total Score: 1    Past Medical History:   Diagnosis Date   • Anemia    • Anxiety    • Hypertension    • Hypothyroidism      No Known Allergies     Past Surgical History:   Procedure Laterality Date   • NO PAST SURGERIES          Social History     Tobacco Use   • Smoking status: Current Every Day Smoker     Packs/day: 1.00     Years: 30.00     Pack years: 30.00     Types: Cigarettes     Start date: 8/24/1980   • Smokeless tobacco: Never Used   Substance Use Topics   • Alcohol use: Not Currently   • Drug use: Not Currently     Comment: Former       Family History   Problem Relation Age of Onset   • Colon cancer Father    • Other Father         Colon Neoplasm   • Other Sister         Breast Neoplasm   • Diabetes type I Paternal Grandmother    • Arthritis Paternal Grandmother         Health Maintenance Due   Topic Date Due   • COLORECTAL CANCER SCREENING  Never done   • ANNUAL PHYSICAL  Never done   • COVID-19 Vaccine (1) Never done   • Pneumococcal Vaccine 0-64 (1 of 2 - PPSV23) Never done   • LUNG CANCER SCREENING  Never done        Current Outpatient Medications on File Prior to Visit   Medication Sig   • Euthyrox 88 MCG tablet Take 1 tablet by mouth Daily.   • [DISCONTINUED] escitalopram (LEXAPRO) 20 MG tablet Take 1 tablet by mouth Daily.   • [DISCONTINUED] ferrous sulfate 324 (65 Fe) MG tablet delayed-release EC tablet Take 324 mg by mouth Daily With Breakfast.   • [DISCONTINUED] lisinopril (PRINIVIL,ZESTRIL) 20 MG tablet Take 1 tablet by mouth 2 (Two) Times a Day.   • [DISCONTINUED] omeprazole (priLOSEC) 40 MG capsule Take 1 capsule by mouth Daily.     No current facility-administered medications on  "file prior to visit.       Immunization History   Administered Date(s) Administered   • Flu Vaccine Intradermal Quad 18-64YR 12/01/2016   • Flu Vaccine Quad PF >18YRS 10/21/2019   • FluLaval/Fluarix/Fluzone >6 08/07/2017, 08/10/2018   • Hepatitis A 06/18/2018   • Hepatitis B 12/29/2016, 03/03/2017   • Influenza, Unspecified 10/21/2019, 10/07/2020   • Shingrix 03/10/2018, 06/18/2018   • Tdap 12/01/2016       Review of Systems     Objective     /82   Pulse 91   Temp 97.5 °F (36.4 °C)   Ht 175.3 cm (69\")   Wt 89.4 kg (197 lb)   SpO2 98%   BMI 29.09 kg/m²       Physical Exam  Vitals reviewed.   Constitutional:       General: He is not in acute distress.     Appearance: He is well-developed and overweight.   HENT:      Head: Normocephalic and atraumatic.   Eyes:      General: No scleral icterus.     Conjunctiva/sclera: Conjunctivae normal.   Neck:      Thyroid: No thyromegaly or thyroid tenderness.      Vascular: No carotid bruit.      Trachea: Trachea normal.   Cardiovascular:      Rate and Rhythm: Normal rate and regular rhythm.      Pulses: Normal pulses.      Heart sounds: No murmur heard.      Pulmonary:      Effort: Pulmonary effort is normal.      Breath sounds: Normal breath sounds. No wheezing, rhonchi or rales.   Abdominal:      General: Bowel sounds are normal. There is no distension.      Palpations: Abdomen is soft. There is no mass.      Tenderness: There is no abdominal tenderness.   Musculoskeletal:         General: Normal range of motion.      Cervical back: Normal range of motion and neck supple.      Right lower leg: No edema.      Left lower leg: No edema.   Lymphadenopathy:      Cervical: No cervical adenopathy.   Skin:     General: Skin is warm and dry.      Comments: Hypopigmented skin lesion to the right arm, at the elbow - it is almost ulcerative in appearance, or cratered - like scar tissue. No erythema, drainage, or exudate.    Neurological:      Mental Status: He is alert and " oriented to person, place, and time.   Psychiatric:         Mood and Affect: Mood and affect normal.         Behavior: Behavior normal.         Thought Content: Thought content normal.         Judgment: Judgment normal.       Result Review :     The following data was reviewed by: VIJAY Roldan on 02/18/2022:    CMP    CMP 5/14/21 10/9/21   Glucose 98 143 (A)   BUN 12 8   Creatinine 1.28 (A) 1.05   eGFR Non African Am  73   Sodium 138 138   Potassium 4.3 4.0   Chloride 99 102   Calcium 9.4 8.8   Albumin 4.7 4.10   Total Bilirubin 0.26 0.2   Alkaline Phosphatase 72 79   AST (SGOT) 21 18   ALT (SGPT) 19 17   (A) Abnormal value            CBC    CBC 5/14/21 10/9/21   WBC 10.09 6.99   RBC 3.98 (A) 4.35   Hemoglobin 12.1 (A) 13.0   Hematocrit 36.9 (A) 38.5   MCV 92.7 88.5   MCH 30.4 29.9   MCHC 32.8 (A) 33.8   RDW 13.9 14.6   Platelets 362 297   (A) Abnormal value            Lipid Panel    Lipid Panel 5/14/21   Total Cholesterol 213 (A)   Triglycerides 226 (A)   HDL Cholesterol 39 (A)   VLDL Cholesterol 45 (A)   LDL Cholesterol  129 (A)   (A) Abnormal value       Comments are available for some flowsheets but are not being displayed.           TSH    TSH 5/14/21   TSH 3.960           PSA    PSA 5/14/21   PSA 0.61             Data reviewed: Radiologic studies :   US Head Neck Soft Tissue (06/03/2019 13:12)  XR Chest 1 View (10/09/2021 21:39)  ED with José Miguel Nugent MD (10/09/2021)           Assessment and Plan      Diagnoses and all orders for this visit:    1. Essential hypertension (Primary)  -     CBC Auto Differential  -     Comprehensive Metabolic Panel  -     Lipid Panel; Future  -     Microalbumin / Creatinine Urine Ratio - Urine, Clean Catch  -     lisinopril (PRINIVIL,ZESTRIL) 20 MG tablet; Take 1 tablet by mouth 2 (Two) Times a Day.  Dispense: 180 tablet; Refill: 1    2. Hypothyroidism, unspecified type  -     US Thyroid; Future    3. Thyroid nodule  -     US Thyroid; Future    4. Anxiety  -      busPIRone (BUSPAR) 5 MG tablet; Take 1 tablet by mouth 2 (Two) Times a Day.  Dispense: 60 tablet; Refill: 0  -     escitalopram (LEXAPRO) 20 MG tablet; Take 1 tablet by mouth Daily.  Dispense: 90 tablet; Refill: 1    5. Changing skin lesion  -     Ambulatory Referral to Dermatology    6. Encounter for screening for malignant neoplasm of colon  -     Ambulatory Referral For Screening Colonoscopy    7. Cigarette nicotine dependence with nicotine-induced disorder  -      CT Chest Low Dose Cancer Screening WO; Future            Follow Up     Return in about 1 month (around 3/18/2022) for Recheck.    Patient was given instructions and counseling regarding his condition or for health maintenance advice. Please see specific information pulled into the AVS if appropriate.     Simon REID Raj  reports that he has been smoking cigarettes. He started smoking about 41 years ago. He has a 30.00 pack-year smoking history. He has never used smokeless tobacco.. I have educated him on the risk of diseases from using tobacco products such as cancer, COPD and heart disease.     I advised him to quit and he is not willing to quit.    I spent 3  minutes counseling the patient.

## 2022-02-18 NOTE — PROGRESS NOTES
Venipuncture Blood Specimen Collection  Venipuncture performed in left arm by Maritza Enrique with good hemostasis. Patient tolerated the procedure well without complications.   02/18/22   Maritza Enrique

## 2022-02-19 LAB
ALBUMIN SERPL-MCNC: 4.2 G/DL (ref 3.5–5.2)
ALBUMIN UR-MCNC: 7.3 MG/DL
ALBUMIN/GLOB SERPL: 1.1 G/DL
ALP SERPL-CCNC: 85 U/L (ref 39–117)
ALT SERPL W P-5'-P-CCNC: 17 U/L (ref 1–41)
ANION GAP SERPL CALCULATED.3IONS-SCNC: 10.9 MMOL/L (ref 5–15)
AST SERPL-CCNC: 17 U/L (ref 1–40)
BILIRUB SERPL-MCNC: 0.2 MG/DL (ref 0–1.2)
BUN SERPL-MCNC: 11 MG/DL (ref 6–20)
BUN/CREAT SERPL: 10.3 (ref 7–25)
CALCIUM SPEC-SCNC: 9.5 MG/DL (ref 8.6–10.5)
CHLORIDE SERPL-SCNC: 100 MMOL/L (ref 98–107)
CO2 SERPL-SCNC: 26.1 MMOL/L (ref 22–29)
CREAT SERPL-MCNC: 1.07 MG/DL (ref 0.76–1.27)
CREAT UR-MCNC: 161.9 MG/DL
FERRITIN SERPL-MCNC: 193 NG/ML (ref 30–400)
GFR SERPL CREATININE-BSD FRML MDRD: 71 ML/MIN/1.73
GLOBULIN UR ELPH-MCNC: 3.9 GM/DL
GLUCOSE SERPL-MCNC: 82 MG/DL (ref 65–99)
IRON 24H UR-MRATE: 48 MCG/DL (ref 59–158)
IRON SATN MFR SERPL: 13 % (ref 20–50)
MICROALBUMIN/CREAT UR: 45.1 MG/G
POTASSIUM SERPL-SCNC: 4.2 MMOL/L (ref 3.5–5.2)
PROT SERPL-MCNC: 8.1 G/DL (ref 6–8.5)
SODIUM SERPL-SCNC: 137 MMOL/L (ref 136–145)
T4 FREE SERPL-MCNC: 0.93 NG/DL (ref 0.93–1.7)
TIBC SERPL-MCNC: 356 MCG/DL (ref 298–536)
TRANSFERRIN SERPL-MCNC: 239 MG/DL (ref 200–360)
TSH SERPL DL<=0.05 MIU/L-ACNC: 9.15 UIU/ML (ref 0.27–4.2)

## 2022-02-21 DIAGNOSIS — E03.9 HYPOTHYROIDISM, UNSPECIFIED TYPE: ICD-10-CM

## 2022-02-21 RX ORDER — LEVOTHYROXINE SODIUM 0.1 MG/1
100 TABLET ORAL DAILY
Qty: 60 TABLET | Refills: 0 | Status: SHIPPED | OUTPATIENT
Start: 2022-02-21 | End: 2022-05-19 | Stop reason: SDUPTHER

## 2022-03-18 ENCOUNTER — OFFICE VISIT (OUTPATIENT)
Dept: FAMILY MEDICINE CLINIC | Facility: CLINIC | Age: 57
End: 2022-03-18

## 2022-03-18 VITALS
BODY MASS INDEX: 28.8 KG/M2 | SYSTOLIC BLOOD PRESSURE: 140 MMHG | OXYGEN SATURATION: 99 % | WEIGHT: 195 LBS | DIASTOLIC BLOOD PRESSURE: 80 MMHG | HEART RATE: 71 BPM | TEMPERATURE: 96.8 F

## 2022-03-18 DIAGNOSIS — L30.9 ECZEMA, UNSPECIFIED TYPE: ICD-10-CM

## 2022-03-18 DIAGNOSIS — F41.9 ANXIETY: Primary | ICD-10-CM

## 2022-03-18 DIAGNOSIS — I10 PRIMARY HYPERTENSION: ICD-10-CM

## 2022-03-18 PROCEDURE — 99214 OFFICE O/P EST MOD 30 MIN: CPT | Performed by: NURSE PRACTITIONER

## 2022-03-18 RX ORDER — BUSPIRONE HYDROCHLORIDE 10 MG/1
10 TABLET ORAL 2 TIMES DAILY
Qty: 60 TABLET | Refills: 0 | Status: SHIPPED | OUTPATIENT
Start: 2022-03-18 | End: 2022-06-01 | Stop reason: SDUPTHER

## 2022-03-18 RX ORDER — TRIAMCINOLONE ACETONIDE 1 MG/ML
LOTION TOPICAL 2 TIMES DAILY
Qty: 60 ML | Refills: 0 | Status: SHIPPED | OUTPATIENT
Start: 2022-03-18 | End: 2022-06-01 | Stop reason: SDUPTHER

## 2022-03-18 RX ORDER — HYDROCHLOROTHIAZIDE 12.5 MG/1
12.5 TABLET ORAL DAILY
Qty: 30 TABLET | Refills: 0 | Status: SHIPPED | OUTPATIENT
Start: 2022-03-18 | End: 2022-06-01 | Stop reason: SDUPTHER

## 2022-03-18 NOTE — PROGRESS NOTES
Chief Complaint  Anxiety (Follow up)    Subjective            Simon Anthony presents to [facility]  History of Present Illness     Patient presents to the office today to follow-up on anxiety.  At the time of his last appointment he endorsed feeling more on edge or angry while at work.  He has been taking Lexapro 20 mg daily for quite some time, but felt that efficacy of the medication was maybe less than it used to be.  He was initiated on BuSpar 5 mg twice daily. He feels like there has been some improvement in his mood - maybe 30% better. He has not noted any adverse s/e with the addition of the medication. He does feel like his mood could still use improvement - still on edge/angry/anxious - but not as much. States he had a pretty decent week this week. He denies any HI/SI, and no AVH.      Blood pressure is 140/80 today, this is with taking lisinopril 20 mg twice daily.  He denies chest pain, palpitations, headaches, dizziness, or shortness of breath.  No swelling in his legs.    He states he is still awaiting a call from dermatology for his referral regarding the changing skin lesion on the right upper extremity.  He would also like to mention a rash that he has developed over the past several months, probably longer.  He states that he has had the issue for as long as he can remember, but it is seemingly getting worse.  The rash is very itchy.  He puts lotion on it and it does not seem to improve.  Involves his lower extremities, his abdomen and back, and even his forearms.    Past Medical History:   Diagnosis Date   • Anemia    • Anxiety    • Hypertension    • Hypothyroidism        No Known Allergies     Past Surgical History:   Procedure Laterality Date   • NO PAST SURGERIES          Social History     Tobacco Use   • Smoking status: Current Every Day Smoker     Packs/day: 1.00     Years: 30.00     Pack years: 30.00     Types: Cigarettes     Start date: 8/24/1980   •  Smokeless tobacco: Never Used   Substance Use Topics   • Alcohol use: Not Currently   • Drug use: Not Currently     Comment: Former       Family History   Problem Relation Age of Onset   • Colon cancer Father    • Other Father         Colon Neoplasm   • Other Sister         Breast Neoplasm   • Diabetes type I Paternal Grandmother    • Arthritis Paternal Grandmother         Health Maintenance Due   Topic Date Due   • COLORECTAL CANCER SCREENING  Never done   • ANNUAL PHYSICAL  Never done   • COVID-19 Vaccine (1) Never done   • Pneumococcal Vaccine 0-64 (1 of 2 - PPSV23) Never done   • LUNG CANCER SCREENING  Never done        Current Outpatient Medications on File Prior to Visit   Medication Sig   • escitalopram (LEXAPRO) 20 MG tablet Take 1 tablet by mouth Daily.   • levothyroxine (Euthyrox) 100 MCG tablet Take 1 tablet by mouth Daily.   • lisinopril (PRINIVIL,ZESTRIL) 20 MG tablet Take 1 tablet by mouth 2 (Two) Times a Day.   • [DISCONTINUED] busPIRone (BUSPAR) 5 MG tablet Take 1 tablet by mouth 2 (Two) Times a Day.     No current facility-administered medications on file prior to visit.       Immunization History   Administered Date(s) Administered   • Flu Vaccine Intradermal Quad 18-64YR 12/01/2016   • Flu Vaccine Quad PF >18YRS 10/21/2019   • FluLaval/Fluarix/Fluzone >6 08/07/2017, 08/10/2018   • Hepatitis A 06/18/2018   • Hepatitis B 12/29/2016, 03/03/2017   • Influenza, Unspecified 10/21/2019, 10/07/2020   • Shingrix 03/10/2018, 06/18/2018   • Tdap 12/01/2016       Review of Systems     Objective     /80   Pulse 71   Temp 96.8 °F (36 °C)   Wt 88.5 kg (195 lb)   SpO2 99%   BMI 28.80 kg/m²       Physical Exam  Vitals reviewed.   Constitutional:       General: He is not in acute distress.     Appearance: He is well-developed and overweight.   HENT:      Head: Normocephalic and atraumatic.   Eyes:      General: No scleral icterus.     Extraocular Movements: Extraocular movements intact.       Conjunctiva/sclera: Conjunctivae normal.   Cardiovascular:      Rate and Rhythm: Normal rate and regular rhythm.      Pulses: Normal pulses.      Heart sounds: No murmur heard.  Pulmonary:      Effort: Pulmonary effort is normal. No respiratory distress.      Breath sounds: Normal breath sounds. No wheezing, rhonchi or rales.   Musculoskeletal:         General: Normal range of motion.      Right lower leg: No edema.      Left lower leg: No edema.   Skin:     General: Skin is warm and dry.      Findings: Rash present.      Comments: On the left lower extremity from his knee to his ankle, anterior aspect, there is diffuse erythema with excoriation, scabbing, and some superficial erosions of the skin.  There is no drainage or exudate.  No silver scaling or plaque.  There are similar findings on his anterior forearms bilaterally, abdomen, and back, but to a much lesser degree.  The findings on the forearms, abdomen, and back appear to be more consistent with eczema.   Neurological:      Mental Status: He is alert and oriented to person, place, and time.   Psychiatric:         Mood and Affect: Mood and affect normal.         Behavior: Behavior normal.         Thought Content: Thought content normal.         Judgment: Judgment normal.         Result Review :     The following data was reviewed by: VIJAY Roldan on 03/18/2022:    CMP    CMP 5/14/21 10/9/21 2/18/22   Glucose 98 143 (A) 82   BUN 12 8 11   Creatinine 1.28 (A) 1.05 1.07   eGFR Non African Am  73 71   Sodium 138 138 137   Potassium 4.3 4.0 4.2   Chloride 99 102 100   Calcium 9.4 8.8 9.5   Albumin 4.7 4.10 4.20   Total Bilirubin 0.26 0.2 0.2   Alkaline Phosphatase 72 79 85   AST (SGOT) 21 18 17   ALT (SGPT) 19 17 17   (A) Abnormal value            CBC    CBC 5/14/21 10/9/21 2/18/22   WBC 10.09 6.99 7.17   RBC 3.98 (A) 4.35 4.21   Hemoglobin 12.1 (A) 13.0 12.5 (A)   Hematocrit 36.9 (A) 38.5 37.4 (A)   MCV 92.7 88.5 88.8   MCH 30.4 29.9 29.7   MCHC  32.8 (A) 33.8 33.4   RDW 13.9 14.6 13.2   Platelets 362 297 363   (A) Abnormal value            Lipid Panel    Lipid Panel 5/14/21   Total Cholesterol 213 (A)   Triglycerides 226 (A)   HDL Cholesterol 39 (A)   VLDL Cholesterol 45 (A)   LDL Cholesterol  129 (A)   (A) Abnormal value       Comments are available for some flowsheets but are not being displayed.           TSH    TSH 5/14/21 2/18/22   TSH 3.960 9.150 (A)   (A) Abnormal value            Microalbumin    Microalbumin 2/18/22   Microalbumin, Urine 7.3           PSA    PSA 5/14/21   PSA 0.61                         Assessment and Plan      Diagnoses and all orders for this visit:    1. Anxiety (Primary)  -     busPIRone (BUSPAR) 10 MG tablet; Take 1 tablet by mouth 2 (Two) Times a Day.  Dispense: 60 tablet; Refill: 0    2. Primary hypertension  -     hydroCHLOROthiazide (HYDRODIURIL) 12.5 MG tablet; Take 1 tablet by mouth Daily.  Dispense: 30 tablet; Refill: 0    3. Eczema, unspecified type  -     triamcinolone (KENALOG) 0.1 % lotion; Apply  topically to the appropriate area as directed 2 (Two) Times a Day.  Dispense: 60 mL; Refill: 0            Follow Up     Return in about 4 weeks (around 4/15/2022) for recheck/follow up - Reminder to recheck fasting lipid profile and thyroid labs on/around 04/01.     Referral information was reviewed with the patient.  A referral request was faxed to Derm specialist on 18 February.  He does not have a voicemail box set up on his phone; however, he does not recall having a missed call either.  We have given him the number to Derm specialist so that he may call and make his own appointment.    Patient was given instructions and counseling regarding his condition or for health maintenance advice. Please see specific information pulled into the AVS if appropriate.

## 2022-05-19 ENCOUNTER — TELEPHONE (OUTPATIENT)
Dept: FAMILY MEDICINE CLINIC | Facility: CLINIC | Age: 57
End: 2022-05-19

## 2022-05-19 DIAGNOSIS — E03.9 HYPOTHYROIDISM, UNSPECIFIED TYPE: ICD-10-CM

## 2022-05-19 RX ORDER — LEVOTHYROXINE SODIUM 0.1 MG/1
100 TABLET ORAL DAILY
Qty: 30 TABLET | Refills: 0 | Status: SHIPPED | OUTPATIENT
Start: 2022-05-19 | End: 2022-06-02 | Stop reason: SDUPTHER

## 2022-05-19 RX ORDER — LEVOTHYROXINE SODIUM 0.1 MG/1
100 TABLET ORAL DAILY
Qty: 60 TABLET | Refills: 0 | Status: CANCELLED | OUTPATIENT
Start: 2022-05-19

## 2022-05-19 NOTE — TELEPHONE ENCOUNTER
Caller: Simon Anthony    Relationship: Self    Best call back number: [phone]    Requested Prescriptions:   Requested Prescriptions     Pending Prescriptions Disp Refills   • levothyroxine (Euthyrox) 100 MCG tablet 60 tablet 0     Sig: Take 1 tablet by mouth Daily.        Pharmacy where request should be sent: [address] [phone] [facility] [phone] [facility]     Additional details provided by patient: PATIENT IS OUT OF MEDICATION    PATIENT REQUESTS CALL BACK WHEN PRESCRIPTION HAS BEEN SENT.    Does the patient have less than a 3 day supply:  [x] Yes  [] No    Ivan Daniel Rep   05/19/22 09:57 EDT

## 2022-06-01 ENCOUNTER — OFFICE VISIT (OUTPATIENT)
Dept: FAMILY MEDICINE CLINIC | Facility: CLINIC | Age: 57
End: 2022-06-01

## 2022-06-01 VITALS
OXYGEN SATURATION: 98 % | TEMPERATURE: 97.8 F | HEIGHT: 69 IN | WEIGHT: 195 LBS | SYSTOLIC BLOOD PRESSURE: 138 MMHG | HEART RATE: 81 BPM | BODY MASS INDEX: 28.88 KG/M2 | DIASTOLIC BLOOD PRESSURE: 88 MMHG

## 2022-06-01 DIAGNOSIS — E03.9 HYPOTHYROIDISM, UNSPECIFIED TYPE: ICD-10-CM

## 2022-06-01 DIAGNOSIS — L30.9 ECZEMA, UNSPECIFIED TYPE: ICD-10-CM

## 2022-06-01 DIAGNOSIS — I10 PRIMARY HYPERTENSION: Primary | ICD-10-CM

## 2022-06-01 DIAGNOSIS — F41.9 ANXIETY: ICD-10-CM

## 2022-06-01 DIAGNOSIS — Z12.5 PROSTATE CANCER SCREENING: ICD-10-CM

## 2022-06-01 DIAGNOSIS — Z12.11 ENCOUNTER FOR SCREENING FOR MALIGNANT NEOPLASM OF COLON: ICD-10-CM

## 2022-06-01 LAB
T4 FREE SERPL-MCNC: 1.01 NG/DL (ref 0.93–1.7)
TSH SERPL DL<=0.05 MIU/L-ACNC: 4.47 UIU/ML (ref 0.27–4.2)

## 2022-06-01 PROCEDURE — G0103 PSA SCREENING: HCPCS | Performed by: NURSE PRACTITIONER

## 2022-06-01 PROCEDURE — 84443 ASSAY THYROID STIM HORMONE: CPT | Performed by: NURSE PRACTITIONER

## 2022-06-01 PROCEDURE — 99214 OFFICE O/P EST MOD 30 MIN: CPT | Performed by: NURSE PRACTITIONER

## 2022-06-01 PROCEDURE — 86376 MICROSOMAL ANTIBODY EACH: CPT | Performed by: NURSE PRACTITIONER

## 2022-06-01 PROCEDURE — 86800 THYROGLOBULIN ANTIBODY: CPT | Performed by: NURSE PRACTITIONER

## 2022-06-01 PROCEDURE — 84439 ASSAY OF FREE THYROXINE: CPT | Performed by: NURSE PRACTITIONER

## 2022-06-01 RX ORDER — BUSPIRONE HYDROCHLORIDE 10 MG/1
10 TABLET ORAL 2 TIMES DAILY
Qty: 180 TABLET | Refills: 1 | Status: SHIPPED | OUTPATIENT
Start: 2022-06-01 | End: 2022-10-14 | Stop reason: SDUPTHER

## 2022-06-01 RX ORDER — HYDROCHLOROTHIAZIDE 12.5 MG/1
12.5 TABLET ORAL DAILY
Qty: 90 TABLET | Refills: 1 | Status: SHIPPED | OUTPATIENT
Start: 2022-06-01 | End: 2022-10-14 | Stop reason: SDUPTHER

## 2022-06-01 RX ORDER — TRIAMCINOLONE ACETONIDE 1 MG/ML
LOTION TOPICAL 2 TIMES DAILY
Qty: 60 ML | Refills: 0 | Status: SHIPPED | OUTPATIENT
Start: 2022-06-01 | End: 2022-10-03 | Stop reason: SDUPTHER

## 2022-06-01 NOTE — PROGRESS NOTES
Chief Complaint  Abnormal Lab (Follow up to labs from february) and Hypertension (refills)    Subjective            Simon Anthony presents to [facility]  History of Present Illness     Patient presents to the office today for follow-up on his thyroid and anxiety.    He is currently prescribed Lexapro 20 mg once daily for treatment of anxiety.  He was previously prescribed BuSpar, back in March, 10 mg twice daily, and reports that this seemingly was working well.  He did run out of the medication and has not had it for probably a month.  He would like to have it prescribed again as it did seem to help with his anxiety.  He currently denies any homicidal ideations or suicidal ideations.  He denies any AVH.  He feels like the medication made him a little bit more levelheaded while at work.  He was not quick to anger.    He is prescribed levothyroxine 100 mcg daily for treatment of hypothyroidism.  He is overdue for labs at this time.  He has a few days left of his medication.  No change in hair, skin, or nails.    He continues to deal with severe dermatitis of his lower extremities.  He was prescribed Kenalog lotion previously and referred to dermatology.  After several attempts to arrange for dermatology appointment, he finally has an appointment later this month.  He states the Kenalog lotion was helping alleviate the itch and actually clear up some of the rash.  He has not been applying lotion or an emollient such as Aquaphor.  Since being out of Kenalog his rash has been recurrent.    He has not yet been arranged for colonoscopy for screening purposes.  He is also not arranged for CT chest low-dose for lung cancer screening, or repeat thyroid ultrasound.    Blood pressure is currently normotensive on exam, 138/88, but he is currently only taking lisinopril 20 twice daily.  He ran out of HCTZ 12.5 mg daily.  He requests a refill of the HCTZ.  He has not yet returned to the office  for fasting lipids.    Past Medical History:   Diagnosis Date   • Anemia    • Anxiety    • Hypertension    • Hypothyroidism      No Known Allergies     Past Surgical History:   Procedure Laterality Date   • NO PAST SURGERIES          Social History     Tobacco Use   • Smoking status: Current Every Day Smoker     Packs/day: 1.00     Years: 30.00     Pack years: 30.00     Types: Cigarettes     Start date: 8/24/1980   • Smokeless tobacco: Never Used   Substance Use Topics   • Alcohol use: Not Currently   • Drug use: Not Currently     Comment: Former       Family History   Problem Relation Age of Onset   • Colon cancer Father    • Other Father         Colon Neoplasm   • Other Sister         Breast Neoplasm   • Diabetes type I Paternal Grandmother    • Arthritis Paternal Grandmother         Health Maintenance Due   Topic Date Due   • COLORECTAL CANCER SCREENING  Never done   • ANNUAL PHYSICAL  Never done   • COVID-19 Vaccine (1) Never done   • Pneumococcal Vaccine 0-64 (1 - PCV) Never done   • LUNG CANCER SCREENING  Never done        Current Outpatient Medications on File Prior to Visit   Medication Sig   • escitalopram (LEXAPRO) 20 MG tablet Take 1 tablet by mouth Daily.   • levothyroxine (Euthyrox) 100 MCG tablet Take 1 tablet by mouth Daily.   • lisinopril (PRINIVIL,ZESTRIL) 20 MG tablet Take 1 tablet by mouth 2 (Two) Times a Day.   • [DISCONTINUED] busPIRone (BUSPAR) 10 MG tablet Take 1 tablet by mouth 2 (Two) Times a Day.   • [DISCONTINUED] hydroCHLOROthiazide (HYDRODIURIL) 12.5 MG tablet Take 1 tablet by mouth Daily.   • [DISCONTINUED] triamcinolone (KENALOG) 0.1 % lotion Apply  topically to the appropriate area as directed 2 (Two) Times a Day.     No current facility-administered medications on file prior to visit.       Immunization History   Administered Date(s) Administered   • Flu Vaccine Intradermal Quad 18-64YR 12/01/2016   • FluLaval/Fluarix/Fluzone >6 08/07/2017, 08/10/2018   • Fluzone Split Quad  "(Multi-dose) 10/21/2019   • Hepatitis A 06/18/2018   • Hepatitis B 12/29/2016, 03/03/2017   • Influenza, Unspecified 10/21/2019, 10/07/2020   • Shingrix 03/10/2018, 06/18/2018   • Tdap 12/01/2016       Review of Systems     Objective     /88 (BP Location: Left arm)   Pulse 81   Temp 97.8 °F (36.6 °C)   Ht 175.3 cm (69\")   Wt 88.5 kg (195 lb)   SpO2 98%   BMI 28.80 kg/m²       Physical Exam  Vitals reviewed.   Constitutional:       General: He is not in acute distress.     Appearance: He is well-developed and overweight.   HENT:      Head: Normocephalic and atraumatic.   Eyes:      General: No scleral icterus.     Extraocular Movements: Extraocular movements intact.      Conjunctiva/sclera: Conjunctivae normal.   Neck:      Thyroid: No thyroid mass, thyromegaly or thyroid tenderness.      Vascular: No carotid bruit.      Trachea: Trachea normal.   Cardiovascular:      Rate and Rhythm: Normal rate and regular rhythm.      Pulses: Normal pulses.      Heart sounds: No murmur heard.  Pulmonary:      Effort: Pulmonary effort is normal. No respiratory distress.      Breath sounds: Normal breath sounds. No wheezing, rhonchi or rales.   Musculoskeletal:         General: Normal range of motion.      Cervical back: Normal range of motion and neck supple.      Right lower leg: No edema.      Left lower leg: No edema.   Lymphadenopathy:      Cervical: No cervical adenopathy.   Skin:     General: Skin is warm and dry.      Findings: Rash present.      Comments: On the left lower extremity from his knee to his ankle, anterior aspect, there is diffuse erythema with excoriation, scabbing, and some superficial erosions of the skin.  There is no drainage or exudate.  No silver scaling or plaque.     Neurological:      Mental Status: He is alert and oriented to person, place, and time.   Psychiatric:         Mood and Affect: Mood and affect normal.         Behavior: Behavior normal.         Thought Content: Thought content " normal.         Judgment: Judgment normal.         Result Review :     The following data was reviewed by: VIJAY Roldan on 06/01/2022:    CMP    CMP 10/9/21 2/18/22   Glucose 143 (A) 82   BUN 8 11   Creatinine 1.05 1.07   eGFR Non African Am 73 71   Sodium 138 137   Potassium 4.0 4.2   Chloride 102 100   Calcium 8.8 9.5   Albumin 4.10 4.20   Total Bilirubin 0.2 0.2   Alkaline Phosphatase 79 85   AST (SGOT) 18 17   ALT (SGPT) 17 17   (A) Abnormal value            CBC    CBC 10/9/21 2/18/22   WBC 6.99 7.17   RBC 4.35 4.21   Hemoglobin 13.0 12.5 (A)   Hematocrit 38.5 37.4 (A)   MCV 88.5 88.8   MCH 29.9 29.7   MCHC 33.8 33.4   RDW 14.6 13.2   Platelets 297 363   (A) Abnormal value            TSH    TSH 2/18/22   TSH 9.150 (A)   (A) Abnormal value            Microalbumin    Microalbumin 2/18/22   Microalbumin, Urine 7.3           PSA Screen (05/14/2021 09:01)              Assessment and Plan      Diagnoses and all orders for this visit:    1. Primary hypertension (Primary)  -     hydroCHLOROthiazide (HYDRODIURIL) 12.5 MG tablet; Take 1 tablet by mouth Daily.  Dispense: 90 tablet; Refill: 1    2. Hypothyroidism, unspecified type  -     TSH+Free T4  -     Thyroid Antibodies    3. Anxiety  -     busPIRone (BUSPAR) 10 MG tablet; Take 1 tablet by mouth 2 (Two) Times a Day.  Dispense: 180 tablet; Refill: 1    4. Eczema, unspecified type  Comments:  I will renew Kenalog lotion.  Prescription for Aquaphor.  Follow-up with dermatology later this month as scheduled.  Orders:  -     triamcinolone (KENALOG) 0.1 % lotion; Apply  topically to the appropriate area as directed 2 (Two) Times a Day.  Dispense: 60 mL; Refill: 0  -     Emollient (Aquaphor Advanced Therapy) ointment ointment; Apply 1 application topically to the appropriate area as directed 2 (Two) Times a Day As Needed (skin dryness/itching).  Dispense: 396 g; Refill: 0    5. Prostate cancer screening  -     PSA Screen    6. Encounter for screening for  malignant neoplasm of colon  -     Cancel: Ambulatory Referral For Screening Colonoscopy            Follow Up     Return for follow up pending outcome of labs.     I will make changes to levothyroxine pending outcome of his TSH and thyroid antibodies.  I have encouraged him to get his thyroid ultrasound rescheduled.  I have also encouraged him to reschedule his colonoscopy -he was referred earlier this year.  Encouraged him to reschedule CT of the chest low-dose for lung cancer screening as well.  We will do a PSA today with his thyroid labs.    I have asked him to return fasting for his lipid profile which was ordered in February.    Patient was given instructions and counseling regarding his condition or for health maintenance advice. Please see specific information pulled into the AVS if appropriate.

## 2022-06-01 NOTE — PROGRESS NOTES
..  Venipuncture Blood Specimen Collection  Venipuncture performed in left arm by Kellie Simms with good hemostasis. Patient tolerated the procedure well without complications.   06/01/22   Kellie Simms

## 2022-06-02 DIAGNOSIS — E03.9 HYPOTHYROIDISM, UNSPECIFIED TYPE: ICD-10-CM

## 2022-06-02 LAB — PSA SERPL-MCNC: 0.59 NG/ML (ref 0–4)

## 2022-06-02 RX ORDER — LEVOTHYROXINE SODIUM 112 UG/1
112 TABLET ORAL DAILY
Qty: 60 TABLET | Refills: 0 | Status: SHIPPED | OUTPATIENT
Start: 2022-06-02 | End: 2022-08-24

## 2022-06-03 LAB
THYROGLOB AB SERPL-ACNC: 6.6 IU/ML (ref 0–0.9)
THYROPEROXIDASE AB SERPL-ACNC: >600 IU/ML (ref 0–34)

## 2022-06-16 ENCOUNTER — TELEPHONE (OUTPATIENT)
Dept: FAMILY MEDICINE CLINIC | Facility: CLINIC | Age: 57
End: 2022-06-16

## 2022-06-16 NOTE — TELEPHONE ENCOUNTER
"Caller: Simon Anthony    Relationship: Self    Best call back number: [phone]    What is the best time to reach you: ANY    Who are you requesting to speak with (clinical staff, provider,  specific staff member): CLINICAL STAFF    What was the call regarding: PATIENT WOULD LIKE TO SPEAK TO A NURSE ABOUT SOME \"PHYSICAL ISSUES\" HE IS HAVING. PATIENT DID NOT DISCLOSE ANY MORE INFORMATION    Do you require a callback: YES  "

## 2022-06-17 ENCOUNTER — TELEPHONE (OUTPATIENT)
Dept: FAMILY MEDICINE CLINIC | Facility: CLINIC | Age: 57
End: 2022-06-17

## 2022-06-17 ENCOUNTER — OFFICE VISIT (OUTPATIENT)
Dept: FAMILY MEDICINE CLINIC | Facility: CLINIC | Age: 57
End: 2022-06-17

## 2022-06-17 VITALS — HEART RATE: 99 BPM | TEMPERATURE: 97.7 F | WEIGHT: 192 LBS | OXYGEN SATURATION: 98 % | BODY MASS INDEX: 28.35 KG/M2

## 2022-06-17 DIAGNOSIS — J02.9 PHARYNGITIS, UNSPECIFIED ETIOLOGY: ICD-10-CM

## 2022-06-17 DIAGNOSIS — R53.83 OTHER FATIGUE: ICD-10-CM

## 2022-06-17 DIAGNOSIS — R51.9 NONINTRACTABLE HEADACHE, UNSPECIFIED CHRONICITY PATTERN, UNSPECIFIED HEADACHE TYPE: ICD-10-CM

## 2022-06-17 DIAGNOSIS — J01.10 ACUTE NON-RECURRENT FRONTAL SINUSITIS: ICD-10-CM

## 2022-06-17 DIAGNOSIS — R05.9 COUGH: Primary | ICD-10-CM

## 2022-06-17 LAB
EXPIRATION DATE: NORMAL
INTERNAL CONTROL: NORMAL
Lab: NORMAL
SARS-COV-2 AG UPPER RESP QL IA.RAPID: NOT DETECTED

## 2022-06-17 PROCEDURE — 99213 OFFICE O/P EST LOW 20 MIN: CPT | Performed by: FAMILY MEDICINE

## 2022-06-17 PROCEDURE — 87426 SARSCOV CORONAVIRUS AG IA: CPT | Performed by: FAMILY MEDICINE

## 2022-06-17 RX ORDER — AZITHROMYCIN 250 MG/1
TABLET, FILM COATED ORAL
Qty: 6 TABLET | Refills: 0 | Status: SHIPPED | OUTPATIENT
Start: 2022-06-17 | End: 2022-10-14

## 2022-06-17 RX ORDER — PREDNISONE 20 MG/1
60 TABLET ORAL DAILY
Qty: 15 TABLET | Refills: 0 | Status: SHIPPED | OUTPATIENT
Start: 2022-06-17 | End: 2022-06-22

## 2022-06-17 NOTE — TELEPHONE ENCOUNTER
Caller: Simon Anthony    Relationship: Self    Best call back number: [phone]    What is the best time to reach you: ANYTIME    Who are you requesting to speak with (clinical staff, provider,  specific staff member): CLINICAL      What was the call regarding: FEELING ILL WITH COUGH, SORE THROAT AND CONGESTION AND WOULD LIKE A CALL BACK TO SEE IF THERE IS ANYTHING THAT CAN BE DONE TO FEEL BETTER.     Do you require a callback: YES

## 2022-06-17 NOTE — PROGRESS NOTES
Chief Complaint  Sore Throat, Cough, and Fatigue    Subjective          Simon Anthony presents to [facility]  URI   This is a new problem. Episode onset: 3 days. The problem has been gradually worsening. There has been no fever. Associated symptoms include congestion, coughing, headaches, sinus pain and a sore throat. Pertinent negatives include no abdominal pain, chest pain, diarrhea, dysuria, ear pain, joint pain, joint swelling, nausea, neck pain, plugged ear sensation, rash, rhinorrhea, sneezing, swollen glands, vomiting or wheezing. He has tried nothing for the symptoms.       Objective   No Known Allergies  Immunization History   Administered Date(s) Administered   • Flu Vaccine Intradermal Quad 18-64YR 12/01/2016   • FluLaval/Fluarix/Fluzone >6 08/07/2017, 08/10/2018   • Fluzone Split Quad (Multi-dose) 10/21/2019   • Hepatitis A 06/18/2018   • Hepatitis B 12/29/2016, 03/03/2017   • Influenza, Unspecified 10/21/2019, 10/07/2020   • Shingrix 03/10/2018, 06/18/2018   • Tdap 12/01/2016     Past Medical History:   Diagnosis Date   • Anemia    • Anxiety    • Hypertension    • Hypothyroidism       Past Surgical History:   Procedure Laterality Date   • NO PAST SURGERIES        Social History     Socioeconomic History   • Marital status:    Tobacco Use   • Smoking status: Current Every Day Smoker     Packs/day: 1.00     Years: 30.00     Pack years: 30.00     Types: Cigarettes     Start date: 8/24/1980   • Smokeless tobacco: Never Used   Substance and Sexual Activity   • Alcohol use: Not Currently   • Drug use: Not Currently     Comment: Former   • Sexual activity: Defer        Current Outpatient Medications:   •  busPIRone (BUSPAR) 10 MG tablet, Take 1 tablet by mouth 2 (Two) Times a Day., Disp: 180 tablet, Rfl: 1  •  Emollient (Aquaphor Advanced Therapy) ointment ointment, Apply 1 application topically to the appropriate area as directed 2 (Two) Times a Day As Needed (skin  dryness/itching)., Disp: 396 g, Rfl: 0  •  escitalopram (LEXAPRO) 20 MG tablet, Take 1 tablet by mouth Daily., Disp: 90 tablet, Rfl: 1  •  hydroCHLOROthiazide (HYDRODIURIL) 12.5 MG tablet, Take 1 tablet by mouth Daily., Disp: 90 tablet, Rfl: 1  •  levothyroxine (Euthyrox) 112 MCG tablet, Take 1 tablet by mouth Daily., Disp: 60 tablet, Rfl: 0  •  lisinopril (PRINIVIL,ZESTRIL) 20 MG tablet, Take 1 tablet by mouth 2 (Two) Times a Day., Disp: 180 tablet, Rfl: 1  •  triamcinolone (KENALOG) 0.1 % lotion, Apply  topically to the appropriate area as directed 2 (Two) Times a Day., Disp: 60 mL, Rfl: 0   Family History   Problem Relation Age of Onset   • Colon cancer Father    • Other Father         Colon Neoplasm   • Other Sister         Breast Neoplasm   • Diabetes type I Paternal Grandmother    • Arthritis Paternal Grandmother           Vital Signs:   Vitals:    06/17/22 1521   Pulse: 99   Temp: 97.7 °F (36.5 °C)   SpO2: 98%   Weight: 87.1 kg (192 lb)       Review of Systems   HENT: Positive for congestion, sinus pain and sore throat. Negative for ear pain, rhinorrhea and sneezing.    Respiratory: Positive for cough. Negative for wheezing.    Cardiovascular: Negative for chest pain.   Gastrointestinal: Negative for abdominal pain, diarrhea, nausea and vomiting.   Genitourinary: Negative for dysuria.   Musculoskeletal: Negative for joint pain and neck pain.   Skin: Negative for rash.   Neurological: Positive for headaches.      Physical Exam  Vitals reviewed.   Constitutional:       Appearance: Normal appearance. He is well-developed.   HENT:      Head: Normocephalic and atraumatic.      Comments: Bilateral frontal sinus tenderness.     Right Ear: Tympanic membrane, ear canal and external ear normal.      Left Ear: Tympanic membrane, ear canal and external ear normal.      Nose: Nose normal.      Mouth/Throat:      Mouth: Mucous membranes are moist.      Pharynx: Oropharynx is clear. Posterior oropharyngeal erythema present.  No oropharyngeal exudate.   Eyes:      Conjunctiva/sclera: Conjunctivae normal.      Pupils: Pupils are equal, round, and reactive to light.   Cardiovascular:      Rate and Rhythm: Normal rate and regular rhythm.      Pulses: Normal pulses.      Heart sounds: Normal heart sounds. No murmur heard.    No friction rub. No gallop.   Pulmonary:      Effort: Pulmonary effort is normal.      Breath sounds: Normal breath sounds. No wheezing or rhonchi.   Abdominal:      General: Abdomen is flat. Bowel sounds are normal. There is no distension.      Palpations: Abdomen is soft. There is no mass.      Tenderness: There is no abdominal tenderness. There is no guarding or rebound.      Hernia: No hernia is present.   Musculoskeletal:         General: Normal range of motion.      Cervical back: Normal range of motion and neck supple.   Skin:     General: Skin is warm and dry.      Capillary Refill: Capillary refill takes less than 2 seconds.   Neurological:      General: No focal deficit present.      Mental Status: He is alert and oriented to person, place, and time.      Cranial Nerves: No cranial nerve deficit.   Psychiatric:         Mood and Affect: Mood and affect normal.         Behavior: Behavior normal.         Thought Content: Thought content normal.         Judgment: Judgment normal.        Result Review :                 Assessment and Plan    Diagnoses and all orders for this visit:    1. Cough (Primary)  -     POCT SARS-CoV-2 Antigen DAY    2. Nonintractable headache, unspecified chronicity pattern, unspecified headache type  -     POCT SARS-CoV-2 Antigen DAY    3. Other fatigue  -     POCT SARS-CoV-2 Antigen DAY    4. Pharyngitis, unspecified etiology            Follow Up   Return if symptoms worsen or fail to improve.  Patient was given instructions and counseling regarding his condition or for health maintenance advice. Please see specific information pulled into the AVS if appropriate.

## 2022-08-24 DIAGNOSIS — F41.9 ANXIETY: ICD-10-CM

## 2022-08-24 DIAGNOSIS — E03.9 HYPOTHYROIDISM, UNSPECIFIED TYPE: ICD-10-CM

## 2022-08-24 RX ORDER — ESCITALOPRAM OXALATE 20 MG/1
TABLET ORAL
Qty: 90 TABLET | Refills: 0 | Status: SHIPPED | OUTPATIENT
Start: 2022-08-24 | End: 2022-10-14 | Stop reason: SDUPTHER

## 2022-08-24 RX ORDER — LEVOTHYROXINE SODIUM 112 UG/1
TABLET ORAL
Qty: 30 TABLET | Refills: 0 | Status: SHIPPED | OUTPATIENT
Start: 2022-08-24 | End: 2022-10-03 | Stop reason: SDUPTHER

## 2022-08-30 DIAGNOSIS — I10 ESSENTIAL HYPERTENSION: ICD-10-CM

## 2022-08-31 RX ORDER — LISINOPRIL 20 MG/1
TABLET ORAL
Qty: 60 TABLET | Refills: 0 | Status: SHIPPED | OUTPATIENT
Start: 2022-08-31 | End: 2022-10-03 | Stop reason: SDUPTHER

## 2022-09-07 ENCOUNTER — TELEPHONE (OUTPATIENT)
Dept: FAMILY MEDICINE CLINIC | Facility: CLINIC | Age: 57
End: 2022-09-07

## 2022-09-07 NOTE — TELEPHONE ENCOUNTER
Left voicemail to complete overdue labs and to make an apt. Pt was given two weeks of levothyroine 8/24

## 2022-09-12 ENCOUNTER — CLINICAL SUPPORT (OUTPATIENT)
Dept: GASTROENTEROLOGY | Facility: CLINIC | Age: 57
End: 2022-09-12

## 2022-09-12 ENCOUNTER — PREP FOR SURGERY (OUTPATIENT)
Dept: OTHER | Facility: HOSPITAL | Age: 57
End: 2022-09-12

## 2022-09-12 ENCOUNTER — TELEPHONE (OUTPATIENT)
Dept: GASTROENTEROLOGY | Facility: CLINIC | Age: 57
End: 2022-09-12

## 2022-09-12 DIAGNOSIS — Z12.11 COLON CANCER SCREENING: Primary | ICD-10-CM

## 2022-09-12 RX ORDER — CEPHALEXIN 500 MG/1
500 CAPSULE ORAL 2 TIMES DAILY
COMMUNITY
Start: 2022-06-23 | End: 2022-09-12

## 2022-09-12 RX ORDER — SODIUM, POTASSIUM,MAG SULFATES 17.5-3.13G
1 SOLUTION, RECONSTITUTED, ORAL ORAL EVERY 12 HOURS
Qty: 354 ML | Refills: 0 | Status: SHIPPED | OUTPATIENT
Start: 2022-09-12

## 2022-09-12 NOTE — TELEPHONE ENCOUNTER
Attempted to contact patient in regards to screening call to schedule procedure. Patient did not answer left voicemail with office phone number and name to call back. Will follow up with front office to reschedule.

## 2022-09-12 NOTE — PROGRESS NOTES
Simon Anthony  REASON FOR CALL Colonoscopy screening   SENT IN [facility]   Past Medical History:   Diagnosis Date   • Anemia    • Anxiety    • Hypertension    • Hypothyroidism      No Known Allergies  Past Surgical History:   Procedure Laterality Date   • NO PAST SURGERIES       Social History     Socioeconomic History   • Marital status:    Tobacco Use   • Smoking status: Current Every Day Smoker     Packs/day: 1.00     Years: 30.00     Pack years: 30.00     Types: Cigarettes     Start date: 8/24/1980   • Smokeless tobacco: Never Used   Vaping Use   • Vaping Use: Never used   Substance and Sexual Activity   • Alcohol use: Not Currently   • Drug use: Not Currently     Comment: Former   • Sexual activity: Defer     Family History   Problem Relation Age of Onset   • Colon cancer Father    • Other Father         Colon Neoplasm   • Other Sister         Breast Neoplasm   • Diabetes type I Paternal Grandmother    • Arthritis Paternal Grandmother        Current Outpatient Medications:   •  azithromycin (Zithromax Z-Gustabo) 250 MG tablet, Take 2 tablets by mouth on day 1, then 1 tablet daily on days 2-5, Disp: 6 tablet, Rfl: 0  •  busPIRone (BUSPAR) 10 MG tablet, Take 1 tablet by mouth 2 (Two) Times a Day., Disp: 180 tablet, Rfl: 1  •  Emollient (Aquaphor Advanced Therapy) ointment ointment, Apply 1 application topically to the appropriate area as directed 2 (Two) Times a Day As Needed (skin dryness/itching)., Disp: 396 g, Rfl: 0  •  escitalopram (LEXAPRO) 20 MG tablet, Take 1 tablet by mouth once daily, Disp: 90 tablet, Rfl: 0  •  Euthyrox 112 MCG tablet, Take 1 tablet by mouth once daily, Disp: 30 tablet, Rfl: 0  •  hydroCHLOROthiazide (HYDRODIURIL) 12.5 MG tablet, Take 1 tablet by mouth Daily., Disp: 90 tablet, Rfl: 1  •  lisinopril (PRINIVIL,ZESTRIL) 20 MG tablet, Take 1 tablet by mouth twice daily, Disp: 60 tablet, Rfl: 0  •  mupirocin (BACTROBAN) 2 % ointment, APPLY OINTMENT TOPICALLY TWICE DAILY FOR 14 DAYS  TO AFFECTED AREA, Disp: , Rfl:   •  triamcinolone (KENALOG) 0.1 % lotion, Apply  topically to the appropriate area as directed 2 (Two) Times a Day., Disp: 60 mL, Rfl: 0

## 2022-10-03 DIAGNOSIS — I10 ESSENTIAL HYPERTENSION: ICD-10-CM

## 2022-10-03 DIAGNOSIS — E03.9 HYPOTHYROIDISM, UNSPECIFIED TYPE: ICD-10-CM

## 2022-10-03 DIAGNOSIS — L30.9 ECZEMA, UNSPECIFIED TYPE: ICD-10-CM

## 2022-10-03 RX ORDER — LISINOPRIL 20 MG/1
20 TABLET ORAL 2 TIMES DAILY
Qty: 60 TABLET | Refills: 0 | Status: SHIPPED | OUTPATIENT
Start: 2022-10-03 | End: 2022-10-14 | Stop reason: SDUPTHER

## 2022-10-03 RX ORDER — TRIAMCINOLONE ACETONIDE 1 MG/ML
LOTION TOPICAL 2 TIMES DAILY
Qty: 60 ML | Refills: 0 | Status: SHIPPED | OUTPATIENT
Start: 2022-10-03 | End: 2022-10-14 | Stop reason: SDUPTHER

## 2022-10-03 RX ORDER — LEVOTHYROXINE SODIUM 112 UG/1
112 TABLET ORAL DAILY
Qty: 30 TABLET | Refills: 0 | Status: SHIPPED | OUTPATIENT
Start: 2022-10-03 | End: 2022-10-15 | Stop reason: SDUPTHER

## 2022-10-03 NOTE — TELEPHONE ENCOUNTER
Caller: Simon Anthony    Relationship: Self    Best call back number: [phone]     Requested Prescriptions:   Requested Prescriptions     Pending Prescriptions Disp Refills   • lisinopril (PRINIVIL,ZESTRIL) 20 MG tablet 60 tablet 0     Sig: Take 1 tablet by mouth 2 (Two) Times a Day.   • triamcinolone (KENALOG) 0.1 % lotion 60 mL 0     Sig: Apply  topically to the appropriate area as directed 2 (Two) Times a Day.   • levothyroxine (Euthyrox) 112 MCG tablet 30 tablet 0     Sig: Take 1 tablet by mouth Daily.        Pharmacy where request should be sent: [address] [phone] [facility] [phone] FX     Additional details provided by patient: PATIENT IS NEEDING A REFILL. PLEASE CALL AND ADVISE.     Does the patient have less than a 3 day supply:  [x] Yes  [] No    Ivan Marcus Rep   10/03/22 11:14 EDT

## 2022-10-14 ENCOUNTER — OFFICE VISIT (OUTPATIENT)
Dept: FAMILY MEDICINE CLINIC | Facility: CLINIC | Age: 57
End: 2022-10-14

## 2022-10-14 VITALS
DIASTOLIC BLOOD PRESSURE: 80 MMHG | SYSTOLIC BLOOD PRESSURE: 120 MMHG | HEART RATE: 88 BPM | TEMPERATURE: 97.1 F | WEIGHT: 189 LBS | OXYGEN SATURATION: 97 % | BODY MASS INDEX: 27.91 KG/M2

## 2022-10-14 DIAGNOSIS — I10 ESSENTIAL HYPERTENSION: Primary | ICD-10-CM

## 2022-10-14 DIAGNOSIS — E61.1 IRON DEFICIENCY: ICD-10-CM

## 2022-10-14 DIAGNOSIS — Z23 INFLUENZA VACCINATION ADMINISTERED AT CURRENT VISIT: ICD-10-CM

## 2022-10-14 DIAGNOSIS — F41.9 ANXIETY: ICD-10-CM

## 2022-10-14 DIAGNOSIS — R76.8 THYROID ANTIBODY POSITIVE: ICD-10-CM

## 2022-10-14 DIAGNOSIS — Z12.2 SCREENING FOR LUNG CANCER: ICD-10-CM

## 2022-10-14 DIAGNOSIS — L30.9 ECZEMA, UNSPECIFIED TYPE: ICD-10-CM

## 2022-10-14 DIAGNOSIS — F17.218 CIGARETTE NICOTINE DEPENDENCE WITH OTHER NICOTINE-INDUCED DISORDER: ICD-10-CM

## 2022-10-14 DIAGNOSIS — E04.1 THYROID NODULE: ICD-10-CM

## 2022-10-14 DIAGNOSIS — E03.9 HYPOTHYROIDISM, UNSPECIFIED TYPE: ICD-10-CM

## 2022-10-14 LAB
ALBUMIN SERPL-MCNC: 4.4 G/DL (ref 3.5–5.2)
ALBUMIN/GLOB SERPL: 1.6 G/DL
ALP SERPL-CCNC: 71 U/L (ref 39–117)
ALT SERPL W P-5'-P-CCNC: 16 U/L (ref 1–41)
ANION GAP SERPL CALCULATED.3IONS-SCNC: 8.2 MMOL/L (ref 5–15)
AST SERPL-CCNC: 12 U/L (ref 1–40)
BASOPHILS # BLD AUTO: 0.06 10*3/MM3 (ref 0–0.2)
BASOPHILS NFR BLD AUTO: 0.8 % (ref 0–1.5)
BILIRUB SERPL-MCNC: 0.3 MG/DL (ref 0–1.2)
BUN SERPL-MCNC: 17 MG/DL (ref 6–20)
BUN/CREAT SERPL: 16.8 (ref 7–25)
CALCIUM SPEC-SCNC: 9.7 MG/DL (ref 8.6–10.5)
CHLORIDE SERPL-SCNC: 102 MMOL/L (ref 98–107)
CHOLEST SERPL-MCNC: 240 MG/DL (ref 0–200)
CO2 SERPL-SCNC: 24.8 MMOL/L (ref 22–29)
CREAT SERPL-MCNC: 1.01 MG/DL (ref 0.76–1.27)
DEPRECATED RDW RBC AUTO: 46.3 FL (ref 37–54)
EGFRCR SERPLBLD CKD-EPI 2021: 86.7 ML/MIN/1.73
EOSINOPHIL # BLD AUTO: 0.23 10*3/MM3 (ref 0–0.4)
EOSINOPHIL NFR BLD AUTO: 3.1 % (ref 0.3–6.2)
ERYTHROCYTE [DISTWIDTH] IN BLOOD BY AUTOMATED COUNT: 14.3 % (ref 12.3–15.4)
FERRITIN SERPL-MCNC: 326 NG/ML (ref 30–400)
GLOBULIN UR ELPH-MCNC: 2.7 GM/DL
GLUCOSE SERPL-MCNC: 85 MG/DL (ref 65–99)
HCT VFR BLD AUTO: 35.9 % (ref 37.5–51)
HDLC SERPL-MCNC: 42 MG/DL (ref 40–60)
HGB BLD-MCNC: 12.2 G/DL (ref 13–17.7)
IMM GRANULOCYTES # BLD AUTO: 0.01 10*3/MM3 (ref 0–0.05)
IMM GRANULOCYTES NFR BLD AUTO: 0.1 % (ref 0–0.5)
IRON 24H UR-MRATE: 144 MCG/DL (ref 59–158)
IRON SATN MFR SERPL: 34 % (ref 20–50)
LDLC SERPL CALC-MCNC: 166 MG/DL (ref 0–100)
LDLC/HDLC SERPL: 3.89 {RATIO}
LYMPHOCYTES # BLD AUTO: 3.11 10*3/MM3 (ref 0.7–3.1)
LYMPHOCYTES NFR BLD AUTO: 41.9 % (ref 19.6–45.3)
MCH RBC QN AUTO: 30.6 PG (ref 26.6–33)
MCHC RBC AUTO-ENTMCNC: 34 G/DL (ref 31.5–35.7)
MCV RBC AUTO: 90 FL (ref 79–97)
MONOCYTES # BLD AUTO: 0.47 10*3/MM3 (ref 0.1–0.9)
MONOCYTES NFR BLD AUTO: 6.3 % (ref 5–12)
NEUTROPHILS NFR BLD AUTO: 3.55 10*3/MM3 (ref 1.7–7)
NEUTROPHILS NFR BLD AUTO: 47.8 % (ref 42.7–76)
NRBC BLD AUTO-RTO: 0 /100 WBC (ref 0–0.2)
PLATELET # BLD AUTO: 352 10*3/MM3 (ref 140–450)
PMV BLD AUTO: 10.4 FL (ref 6–12)
POTASSIUM SERPL-SCNC: 4.9 MMOL/L (ref 3.5–5.2)
PROT SERPL-MCNC: 7.1 G/DL (ref 6–8.5)
RBC # BLD AUTO: 3.99 10*6/MM3 (ref 4.14–5.8)
SODIUM SERPL-SCNC: 135 MMOL/L (ref 136–145)
T4 FREE SERPL-MCNC: 1.1 NG/DL (ref 0.93–1.7)
TIBC SERPL-MCNC: 425 MCG/DL (ref 298–536)
TRANSFERRIN SERPL-MCNC: 285 MG/DL (ref 200–360)
TRIGL SERPL-MCNC: 174 MG/DL (ref 0–150)
TSH SERPL DL<=0.05 MIU/L-ACNC: 1.19 UIU/ML (ref 0.27–4.2)
VLDLC SERPL-MCNC: 32 MG/DL (ref 5–40)
WBC NRBC COR # BLD: 7.43 10*3/MM3 (ref 3.4–10.8)

## 2022-10-14 PROCEDURE — 83540 ASSAY OF IRON: CPT | Performed by: NURSE PRACTITIONER

## 2022-10-14 PROCEDURE — 84439 ASSAY OF FREE THYROXINE: CPT | Performed by: NURSE PRACTITIONER

## 2022-10-14 PROCEDURE — 82728 ASSAY OF FERRITIN: CPT | Performed by: NURSE PRACTITIONER

## 2022-10-14 PROCEDURE — 84466 ASSAY OF TRANSFERRIN: CPT | Performed by: NURSE PRACTITIONER

## 2022-10-14 PROCEDURE — 90686 IIV4 VACC NO PRSV 0.5 ML IM: CPT | Performed by: NURSE PRACTITIONER

## 2022-10-14 PROCEDURE — 80061 LIPID PANEL: CPT | Performed by: NURSE PRACTITIONER

## 2022-10-14 PROCEDURE — 90471 IMMUNIZATION ADMIN: CPT | Performed by: NURSE PRACTITIONER

## 2022-10-14 PROCEDURE — 99214 OFFICE O/P EST MOD 30 MIN: CPT | Performed by: NURSE PRACTITIONER

## 2022-10-14 PROCEDURE — 80050 GENERAL HEALTH PANEL: CPT | Performed by: NURSE PRACTITIONER

## 2022-10-14 RX ORDER — LISINOPRIL 20 MG/1
20 TABLET ORAL 2 TIMES DAILY
Qty: 180 TABLET | Refills: 1 | Status: SHIPPED | OUTPATIENT
Start: 2022-10-14

## 2022-10-14 RX ORDER — CRISABOROLE 20 MG/G
1 OINTMENT TOPICAL 2 TIMES DAILY
Qty: 100 G | Refills: 0 | Status: SHIPPED | OUTPATIENT
Start: 2022-10-14

## 2022-10-14 RX ORDER — HYDROCHLOROTHIAZIDE 12.5 MG/1
12.5 TABLET ORAL DAILY
Qty: 90 TABLET | Refills: 1 | Status: SHIPPED | OUTPATIENT
Start: 2022-10-14

## 2022-10-14 RX ORDER — ESCITALOPRAM OXALATE 20 MG/1
20 TABLET ORAL DAILY
Qty: 90 TABLET | Refills: 1 | Status: SHIPPED | OUTPATIENT
Start: 2022-10-14

## 2022-10-14 RX ORDER — TRIAMCINOLONE ACETONIDE 1 MG/ML
LOTION TOPICAL 2 TIMES DAILY
Qty: 60 ML | Refills: 1 | Status: SHIPPED | OUTPATIENT
Start: 2022-10-14

## 2022-10-14 RX ORDER — BUSPIRONE HYDROCHLORIDE 10 MG/1
10 TABLET ORAL 2 TIMES DAILY
Qty: 180 TABLET | Refills: 1 | Status: SHIPPED | OUTPATIENT
Start: 2022-10-14

## 2022-10-14 NOTE — PROGRESS NOTES
Venipuncture Blood Specimen Collection  Venipuncture performed in left arm by Maritza Enrique with good hemostasis. Patient tolerated the procedure well without complications.   10/14/22   Maritza Enrique

## 2022-10-14 NOTE — PROGRESS NOTES
Chief Complaint  Hypertension, Hypothyroidism, and Eczema    Subjective            Simon Anthony presents to [facility]  History of Present Illness     Patient is here today to follow-up on chronic health conditions and for medication refills    He is currently prescribed Lexapro 20 mg once daily, in addition to BuSpar 10 mg twice daily.  He reports his symptoms are currently well controlled with this combination of medications.  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  He feels significantly better.  He currently denies any homicidal ideations or suicidal ideations.  He denies any AVH.    He went to dermatology secondary to the abnormal patch of skin on the right upper extremity.  They did a biopsy and it was benign.  They diagnosed him with having eczema of the bilateral lower extremities.  He was given a Kenalog injection at the time of his appointment and a prescription for mupirocin.  He states that the mupirocin alone is not efficacious.  He has been using the Kenalog lotion prescribed by me, but using approximately 1 bottle per week.  He states that it does not last very long.  He has not been using any emollients such as Aquaphor, or lotion to help with skin hydration.  He has never had a trial of Eucrisa.    Blood pressure is currently well controlled with lisinopril and HCTZ.  He denies any chest pain, palpitations, headaches, dizziness, or shortness of breath.    He continues to take levothyroxine for his hypothyroidism.  His thyroid antibodies remain elevated.  He does have a history of thyroid nodule on ultrasound.  He has not yet established with endocrinology.  Initially he did not establish due to lack of insurance and out-of-pocket expense.  He does have insurance currently and is agreeable to try again.  He currently denies any change in hair, skin, or  nails otherwise.    He is agreeable to flu shot today.    His colonoscopy has been scheduled for January of next year.    He has not had CT of the chest for lung cancer screening.  He has a 32-pack-year history.  He continues to smoke and does not wish to quit smoking at this time.      Past Medical History:   Diagnosis Date   • Anemia    • Anxiety    • Eczema    • Hypertension    • Hypothyroidism        No Known Allergies     Past Surgical History:   Procedure Laterality Date   • NO PAST SURGERIES          Social History     Tobacco Use   • Smoking status: Every Day     Packs/day: 1.00     Years: 32.00     Pack years: 32.00     Types: Cigarettes     Start date: 8/24/1980   • Smokeless tobacco: Never   Vaping Use   • Vaping Use: Never used   Substance Use Topics   • Alcohol use: Not Currently   • Drug use: Not Currently     Comment: Former       Family History   Problem Relation Age of Onset   • Colon cancer Father    • Other Father         Colon Neoplasm   • Other Sister         Breast Neoplasm   • Diabetes type I Paternal Grandmother    • Arthritis Paternal Grandmother         Health Maintenance Due   Topic Date Due   • COLORECTAL CANCER SCREENING  Never done   • COVID-19 Vaccine (1) Never done   • ANNUAL PHYSICAL  Never done   • Pneumococcal Vaccine 0-64 (1 - PCV) Never done   • LUNG CANCER SCREENING  Never done        Current Outpatient Medications on File Prior to Visit   Medication Sig   • levothyroxine (Euthyrox) 112 MCG tablet Take 1 tablet by mouth Daily.   • [DISCONTINUED] busPIRone (BUSPAR) 10 MG tablet Take 1 tablet by mouth 2 (Two) Times a Day.   • [DISCONTINUED] escitalopram (LEXAPRO) 20 MG tablet Take 1 tablet by mouth once daily   • [DISCONTINUED] hydroCHLOROthiazide (HYDRODIURIL) 12.5 MG tablet Take 1 tablet by mouth Daily.   • [DISCONTINUED] lisinopril (PRINIVIL,ZESTRIL) 20 MG tablet Take 1 tablet by mouth 2 (Two) Times a Day.   • [DISCONTINUED] triamcinolone (KENALOG) 0.1 % lotion Apply  topically  to the appropriate area as directed 2 (Two) Times a Day.   • Emollient (Aquaphor Advanced Therapy) ointment ointment Apply 1 application topically to the appropriate area as directed 2 (Two) Times a Day As Needed (skin dryness/itching).   • sodium-potassium-magnesium sulfates (Suprep Bowel Prep Kit) 17.5-3.13-1.6 GM/177ML solution oral solution Take 1 bottle by mouth Every 12 (Twelve) Hours.   • [DISCONTINUED] azithromycin (Zithromax Z-Gustabo) 250 MG tablet Take 2 tablets by mouth on day 1, then 1 tablet daily on days 2-5   • [DISCONTINUED] mupirocin (BACTROBAN) 2 % ointment APPLY OINTMENT TOPICALLY TWICE DAILY FOR 14 DAYS TO AFFECTED AREA     No current facility-administered medications on file prior to visit.       Immunization History   Administered Date(s) Administered   • Flu Vaccine Intradermal Quad 18-64YR 12/01/2016   • FluLaval/Fluzone >6mos 08/07/2017, 08/10/2018, 10/14/2022   • Fluzone Quad >6mos (Multi-dose) 10/21/2019   • Hepatitis A 06/18/2018   • Hepatitis B 12/29/2016, 03/03/2017   • Influenza, Unspecified 10/21/2019, 10/07/2020   • Shingrix 03/10/2018, 06/18/2018   • Tdap 12/01/2016       Review of Systems     Objective     /80   Pulse 88   Temp 97.1 °F (36.2 °C)   Wt 85.7 kg (189 lb)   SpO2 97%   BMI 27.91 kg/m²       Physical Exam  Vitals reviewed.   Constitutional:       General: He is not in acute distress.     Appearance: He is well-developed and overweight.   HENT:      Head: Normocephalic and atraumatic.   Eyes:      General: No scleral icterus.     Extraocular Movements: Extraocular movements intact.      Conjunctiva/sclera: Conjunctivae normal.   Neck:      Thyroid: No thyroid mass, thyromegaly or thyroid tenderness.      Vascular: No carotid bruit.      Trachea: Trachea normal.   Cardiovascular:      Rate and Rhythm: Normal rate and regular rhythm.      Pulses: Normal pulses.      Heart sounds: No murmur heard.  Pulmonary:      Effort: Pulmonary effort is normal. No respiratory  distress.      Breath sounds: Normal breath sounds. No wheezing, rhonchi or rales.   Musculoskeletal:         General: Normal range of motion.      Cervical back: Normal range of motion and neck supple.      Right lower leg: No edema.      Left lower leg: No edema.   Lymphadenopathy:      Cervical: No cervical adenopathy.   Skin:     General: Skin is warm and dry.      Findings: No rash (BLE - significantly improved from previous exam by me - only mild erythema; no scabbing, drainage, or exudates).   Neurological:      Mental Status: He is alert and oriented to person, place, and time.   Psychiatric:         Mood and Affect: Mood and affect normal.         Behavior: Behavior normal.         Thought Content: Thought content normal.         Judgment: Judgment normal.         Result Review :     The following data was reviewed by: VIJAY Roldan on 10/14/2022:    CMP    CMP 2/18/22   Glucose 82   BUN 11   Creatinine 1.07   eGFR Non African Am 71   Sodium 137   Potassium 4.2   Chloride 100   Calcium 9.5   Albumin 4.20   Total Bilirubin 0.2   Alkaline Phosphatase 85   AST (SGOT) 17   ALT (SGPT) 17           CBC    CBC 2/18/22   WBC 7.17   RBC 4.21   Hemoglobin 12.5 (A)   Hematocrit 37.4 (A)   MCV 88.8   MCH 29.7   MCHC 33.4   RDW 13.2   Platelets 363   (A) Abnormal value            TSH    TSH 2/18/22 6/1/22   TSH 9.150 (A) 4.470 (A)   (A) Abnormal value            Microalbumin    Microalbumin 2/18/22   Microalbumin, Urine 7.3           Ferritin (02/18/2022 15:31)  Iron Profile (02/18/2022 15:31)  PSA Screen (06/01/2022 15:55)    Data reviewed: Consultant notes :   DERMATOLOGY - SCAN - SKIN LESION, DERM SPECS, 06/16/2022 (06/16/2022)           Assessment and Plan      Diagnoses and all orders for this visit:    1. Essential hypertension (Primary)  -     lisinopril (PRINIVIL,ZESTRIL) 20 MG tablet; Take 1 tablet by mouth 2 (Two) Times a Day.  Dispense: 180 tablet; Refill: 1  -     hydroCHLOROthiazide  (HYDRODIURIL) 12.5 MG tablet; Take 1 tablet by mouth Daily.  Dispense: 90 tablet; Refill: 1  -     Comprehensive Metabolic Panel  -     Lipid Panel    2. Anxiety  -     escitalopram (LEXAPRO) 20 MG tablet; Take 1 tablet by mouth Daily.  Dispense: 90 tablet; Refill: 1  -     busPIRone (BUSPAR) 10 MG tablet; Take 1 tablet by mouth 2 (Two) Times a Day.  Dispense: 180 tablet; Refill: 1    3. Hypothyroidism, unspecified type  -     Ambulatory Referral to Endocrinology  -     TSH+Free T4    4. Thyroid nodule  -     Ambulatory Referral to Endocrinology    5. Thyroid antibody positive  -     Ambulatory Referral to Endocrinology    6. Eczema, unspecified type  Comments:  Recommend: Aquaphor/Vaseline and Eucerin or Cerave lotion  Orders:  -     triamcinolone (KENALOG) 0.1 % lotion; Apply  topically to the appropriate area as directed 2 (Two) Times a Day.  Dispense: 60 mL; Refill: 1  -     Crisaborole (Eucrisa) 2 % ointment; Apply 1 each topically 2 (Two) Times a Day.  Dispense: 100 g; Refill: 0    7. Iron deficiency  -     CBC Auto Differential  -     Iron Profile  -     Ferritin    8. Cigarette nicotine dependence with other nicotine-induced disorder  -      CT Chest Low Dose Cancer Screening WO; Future    9. Screening for lung cancer  -      CT Chest Low Dose Cancer Screening WO; Future    10. Influenza vaccination administered at current visit  -     FluLaval/Fluzone >6 mos ([id number])            Follow Up     Return for follow up pending outcome of labs.    Patient was given instructions and counseling regarding his condition or for health maintenance advice. Please see specific information pulled into the AVS if appropriate.     Simon Anthony  reports that he has been smoking cigarettes. He started smoking about 42 years ago. He has a 32.00 pack-year smoking history. He has never used smokeless tobacco.. I have educated him on the risk of diseases from using tobacco products such as cancer, COPD and heart disease.      I advised him to quit and he is not willing to quit.    I spent 3  minutes counseling the patient.

## 2022-10-15 DIAGNOSIS — E03.9 HYPOTHYROIDISM, UNSPECIFIED TYPE: ICD-10-CM

## 2022-10-15 RX ORDER — LEVOTHYROXINE SODIUM 112 UG/1
112 TABLET ORAL DAILY
Qty: 90 TABLET | Refills: 1 | Status: SHIPPED | OUTPATIENT
Start: 2022-10-15

## 2023-01-12 ENCOUNTER — TELEPHONE (OUTPATIENT)
Dept: GASTROENTEROLOGY | Facility: CLINIC | Age: 58
End: 2023-01-12
Payer: COMMERCIAL

## 2023-01-22 ENCOUNTER — APPOINTMENT (OUTPATIENT)
Dept: CT IMAGING | Facility: HOSPITAL | Age: 58
End: 2023-01-22
Payer: COMMERCIAL

## 2023-01-22 ENCOUNTER — APPOINTMENT (OUTPATIENT)
Dept: GENERAL RADIOLOGY | Facility: HOSPITAL | Age: 58
End: 2023-01-22
Payer: COMMERCIAL

## 2023-01-22 ENCOUNTER — HOSPITAL ENCOUNTER (EMERGENCY)
Facility: HOSPITAL | Age: 58
Discharge: HOME | End: 2023-01-22
Attending: EMERGENCY MEDICINE | Admitting: EMERGENCY MEDICINE
Payer: COMMERCIAL

## 2023-01-22 VITALS
OXYGEN SATURATION: 97 % | DIASTOLIC BLOOD PRESSURE: 77 MMHG | HEART RATE: 74 BPM | RESPIRATION RATE: 18 BRPM | BODY MASS INDEX: 28.24 KG/M2 | WEIGHT: 190.7 LBS | TEMPERATURE: 98.6 F | HEIGHT: 69 IN | SYSTOLIC BLOOD PRESSURE: 146 MMHG

## 2023-01-22 DIAGNOSIS — R07.9 CHEST PAIN, UNSPECIFIED TYPE: ICD-10-CM

## 2023-01-22 DIAGNOSIS — J40 BRONCHITIS: Primary | ICD-10-CM

## 2023-01-22 LAB
ALBUMIN SERPL-MCNC: 4.2 G/DL (ref 3.5–5.2)
ALBUMIN/GLOB SERPL: 1.1 G/DL
ALP SERPL-CCNC: 81 U/L (ref 39–117)
ALT SERPL W P-5'-P-CCNC: 12 U/L (ref 1–41)
ANION GAP SERPL CALCULATED.3IONS-SCNC: 11.8 MMOL/L (ref 5–15)
AST SERPL-CCNC: 11 U/L (ref 1–40)
BASOPHILS # BLD AUTO: 0.03 10*3/MM3 (ref 0–0.2)
BASOPHILS NFR BLD AUTO: 0.4 % (ref 0–1.5)
BILIRUB SERPL-MCNC: 0.3 MG/DL (ref 0–1.2)
BUN SERPL-MCNC: 12 MG/DL (ref 6–20)
BUN/CREAT SERPL: 12.9 (ref 7–25)
CALCIUM SPEC-SCNC: 9.7 MG/DL (ref 8.6–10.5)
CHLORIDE SERPL-SCNC: 99 MMOL/L (ref 98–107)
CO2 SERPL-SCNC: 25.2 MMOL/L (ref 22–29)
CREAT SERPL-MCNC: 0.93 MG/DL (ref 0.76–1.27)
DEPRECATED RDW RBC AUTO: 43 FL (ref 37–54)
EGFRCR SERPLBLD CKD-EPI 2021: 95.8 ML/MIN/1.73
EOSINOPHIL # BLD AUTO: 0.28 10*3/MM3 (ref 0–0.4)
EOSINOPHIL NFR BLD AUTO: 3.7 % (ref 0.3–6.2)
ERYTHROCYTE [DISTWIDTH] IN BLOOD BY AUTOMATED COUNT: 13.4 % (ref 12.3–15.4)
GLOBULIN UR ELPH-MCNC: 3.9 GM/DL
GLUCOSE SERPL-MCNC: 85 MG/DL (ref 65–99)
HCT VFR BLD AUTO: 30.9 % (ref 37.5–51)
HGB BLD-MCNC: 10.5 G/DL (ref 13–17.7)
HOLD SPECIMEN: NORMAL
HOLD SPECIMEN: NORMAL
IMM GRANULOCYTES # BLD AUTO: 0.02 10*3/MM3 (ref 0–0.05)
IMM GRANULOCYTES NFR BLD AUTO: 0.3 % (ref 0–0.5)
LYMPHOCYTES # BLD AUTO: 1.98 10*3/MM3 (ref 0.7–3.1)
LYMPHOCYTES NFR BLD AUTO: 26.3 % (ref 19.6–45.3)
MCH RBC QN AUTO: 29.7 PG (ref 26.6–33)
MCHC RBC AUTO-ENTMCNC: 34 G/DL (ref 31.5–35.7)
MCV RBC AUTO: 87.3 FL (ref 79–97)
MONOCYTES # BLD AUTO: 0.77 10*3/MM3 (ref 0.1–0.9)
MONOCYTES NFR BLD AUTO: 10.2 % (ref 5–12)
NEUTROPHILS NFR BLD AUTO: 4.45 10*3/MM3 (ref 1.7–7)
NEUTROPHILS NFR BLD AUTO: 59.1 % (ref 42.7–76)
NRBC BLD AUTO-RTO: 0 /100 WBC (ref 0–0.2)
NT-PROBNP SERPL-MCNC: 496.6 PG/ML (ref 0–900)
PLATELET # BLD AUTO: 485 10*3/MM3 (ref 140–450)
PMV BLD AUTO: 9.1 FL (ref 6–12)
POTASSIUM SERPL-SCNC: 4.3 MMOL/L (ref 3.5–5.2)
PROT SERPL-MCNC: 8.1 G/DL (ref 6–8.5)
RBC # BLD AUTO: 3.54 10*6/MM3 (ref 4.14–5.8)
SODIUM SERPL-SCNC: 136 MMOL/L (ref 136–145)
TROPONIN T SERPL-MCNC: <0.01 NG/ML (ref 0–0.03)
WBC NRBC COR # BLD: 7.53 10*3/MM3 (ref 3.4–10.8)
WHOLE BLOOD HOLD COAG: NORMAL
WHOLE BLOOD HOLD SPECIMEN: NORMAL

## 2023-01-22 PROCEDURE — 0 IOPAMIDOL PER 1 ML: Performed by: EMERGENCY MEDICINE

## 2023-01-22 PROCEDURE — 25010000002 KETOROLAC TROMETHAMINE PER 15 MG: Performed by: EMERGENCY MEDICINE

## 2023-01-22 PROCEDURE — 93005 ELECTROCARDIOGRAM TRACING: CPT | Performed by: EMERGENCY MEDICINE

## 2023-01-22 PROCEDURE — 93005 ELECTROCARDIOGRAM TRACING: CPT

## 2023-01-22 PROCEDURE — 71045 X-RAY EXAM CHEST 1 VIEW: CPT

## 2023-01-22 PROCEDURE — 84484 ASSAY OF TROPONIN QUANT: CPT | Performed by: EMERGENCY MEDICINE

## 2023-01-22 PROCEDURE — 80053 COMPREHEN METABOLIC PANEL: CPT | Performed by: EMERGENCY MEDICINE

## 2023-01-22 PROCEDURE — 93010 ELECTROCARDIOGRAM REPORT: CPT | Performed by: INTERNAL MEDICINE

## 2023-01-22 PROCEDURE — 96375 TX/PRO/DX INJ NEW DRUG ADDON: CPT

## 2023-01-22 PROCEDURE — 85025 COMPLETE CBC W/AUTO DIFF WBC: CPT

## 2023-01-22 PROCEDURE — 96374 THER/PROPH/DIAG INJ IV PUSH: CPT

## 2023-01-22 PROCEDURE — 83880 ASSAY OF NATRIURETIC PEPTIDE: CPT | Performed by: EMERGENCY MEDICINE

## 2023-01-22 PROCEDURE — 25010000002 METHYLPREDNISOLONE PER 125 MG: Performed by: EMERGENCY MEDICINE

## 2023-01-22 PROCEDURE — 71260 CT THORAX DX C+: CPT

## 2023-01-22 PROCEDURE — 99284 EMERGENCY DEPT VISIT MOD MDM: CPT

## 2023-01-22 RX ORDER — PREDNISONE 50 MG/1
50 TABLET ORAL DAILY
Qty: 5 TABLET | Refills: 0 | Status: SHIPPED | OUTPATIENT
Start: 2023-01-22

## 2023-01-22 RX ORDER — METHYLPREDNISOLONE SODIUM SUCCINATE 125 MG/2ML
125 INJECTION, POWDER, LYOPHILIZED, FOR SOLUTION INTRAMUSCULAR; INTRAVENOUS ONCE
Status: COMPLETED | OUTPATIENT
Start: 2023-01-22 | End: 2023-01-22

## 2023-01-22 RX ORDER — AZITHROMYCIN 250 MG/1
TABLET, FILM COATED ORAL
Qty: 6 TABLET | Refills: 0 | Status: SHIPPED | OUTPATIENT
Start: 2023-01-22

## 2023-01-22 RX ORDER — NAPROXEN 500 MG/1
500 TABLET ORAL 2 TIMES DAILY WITH MEALS
Qty: 20 TABLET | Refills: 0 | Status: SHIPPED | OUTPATIENT
Start: 2023-01-22

## 2023-01-22 RX ORDER — SODIUM CHLORIDE 0.9 % (FLUSH) 0.9 %
10 SYRINGE (ML) INJECTION AS NEEDED
Status: DISCONTINUED | OUTPATIENT
Start: 2023-01-22 | End: 2023-01-22 | Stop reason: HOSPADM

## 2023-01-22 RX ORDER — KETOROLAC TROMETHAMINE 30 MG/ML
30 INJECTION, SOLUTION INTRAMUSCULAR; INTRAVENOUS ONCE
Status: COMPLETED | OUTPATIENT
Start: 2023-01-22 | End: 2023-01-22

## 2023-01-22 RX ADMIN — IOPAMIDOL 100 ML: 755 INJECTION, SOLUTION INTRAVENOUS at 15:43

## 2023-01-22 RX ADMIN — KETOROLAC TROMETHAMINE 30 MG: 30 INJECTION, SOLUTION INTRAMUSCULAR; INTRAVENOUS at 13:53

## 2023-01-22 RX ADMIN — METHYLPREDNISOLONE SODIUM SUCCINATE 125 MG: 125 INJECTION, POWDER, FOR SOLUTION INTRAMUSCULAR; INTRAVENOUS at 13:53

## 2023-01-22 NOTE — ED PROVIDER NOTES
Time: 1:11 PM EST  Date of encounter:  1/22/2023  Independent Historian/Clinical History and Information was obtained by:   Patient  Chief Complaint: Chest pain, cough    History is limited by: N/A    History of Present Illness:  Patient is a 57 y.o. year old male who presents to the emergency department for evaluation of chest pain which onset around [place] time. He describes chest pain as constant, 'always there', does not find any aggravating or alleviating factors. He rates pain a 6-7/10 at present. Pt denies any recent fever or chills, though he admits to a fever about 2 weeks ago. He also reports having a productive cough with green/brownish sputum.       used: No        Patient Care Team  Primary Care Provider: Soheila Reyez APRN    Past Medical History:     No Known Allergies  Past Medical History:   Diagnosis Date   • Anemia    • Anxiety    • Eczema    • Hypertension    • Hypothyroidism      Past Surgical History:   Procedure Laterality Date   • NO PAST SURGERIES       Family History   Problem Relation Age of Onset   • Colon cancer Father    • Other Father         Colon Neoplasm   • Other Sister         Breast Neoplasm   • Diabetes type I Paternal Grandmother    • Arthritis Paternal Grandmother        Home Medications:  Prior to Admission medications    Medication Sig Start Date End Date Taking? Authorizing Provider   busPIRone (BUSPAR) 10 MG tablet Take 1 tablet by mouth 2 (Two) Times a Day. 10/14/22   Soheila Reyez APRN   Crisaborole (Eucrisa) 2 % ointment Apply 1 each topically 2 (Two) Times a Day. 10/14/22   Soheila Reyez APRN   Emollient (Aquaphor Advanced Therapy) ointment ointment Apply 1 application topically to the appropriate area as directed 2 (Two) Times a Day As Needed (skin dryness/itching). 6/1/22   Soheila Reyez APRN   escitalopram (LEXAPRO) 20 MG tablet Take 1 tablet by mouth Daily. 10/14/22   Soheila Reyez APRN  "  hydroCHLOROthiazide (HYDRODIURIL) 12.5 MG tablet Take 1 tablet by mouth Daily. 10/14/22   Soheila Reyez APRN   levothyroxine (Euthyrox) 112 MCG tablet Take 1 tablet by mouth Daily. 10/15/22   Soheila Reyez APRN   lisinopril (PRINIVIL,ZESTRIL) 20 MG tablet Take 1 tablet by mouth 2 (Two) Times a Day. 10/14/22   Soheila Reyez APRN   sodium-potassium-magnesium sulfates (Suprep Bowel Prep Kit) 17.5-3.13-1.6 GM/177ML solution oral solution Take 1 bottle by mouth Every 12 (Twelve) Hours. 9/12/22   Noy Cordero MD   triamcinolone (KENALOG) 0.1 % lotion Apply  topically to the appropriate area as directed 2 (Two) Times a Day. 10/14/22   Soheila Reyez APRN        Social History:   Social History     Tobacco Use   • Smoking status: Every Day     Packs/day: 1.50     Years: 32.00     Pack years: 48.00     Types: Cigarettes     Start date: 8/24/1980   • Smokeless tobacco: Never   Vaping Use   • Vaping Use: Never used   Substance Use Topics   • Alcohol use: Not Currently   • Drug use: Not Currently     Comment: Former         Review of Systems:  Review of Systems   Constitutional: Negative for chills and fever.   HENT: Negative for sore throat.    Eyes: Negative for photophobia.   Respiratory: Positive for cough. Negative for shortness of breath.    Cardiovascular: Positive for chest pain.   Gastrointestinal: Negative for abdominal pain, diarrhea, nausea and vomiting.   Genitourinary: Negative for dysuria.   Musculoskeletal: Negative for neck pain.   Skin: Negative for wound.   Neurological: Negative for headaches.   All other systems reviewed and are negative.       Physical Exam:  /81   Pulse 77   Temp 98.6 °F (37 °C) (Oral)   Resp 18   Ht 175.3 cm (69\")   Wt 86.5 kg (190 lb 11.2 oz)   SpO2 97%   BMI 28.16 kg/m²     Physical Exam  Vitals and nursing note reviewed.   Constitutional:       General: He is not in acute distress.  HENT:      Head: Normocephalic and atraumatic. "   Eyes:      Extraocular Movements: Extraocular movements intact.   Cardiovascular:      Rate and Rhythm: Normal rate and regular rhythm.      Pulses: Normal pulses.      Heart sounds: Normal heart sounds.   Pulmonary:      Effort: Pulmonary effort is normal. No respiratory distress.      Breath sounds: No decreased breath sounds.      Comments: Coarse bilateral breath sounds  Abdominal:      General: Abdomen is flat.      Palpations: Abdomen is soft.      Tenderness: There is no abdominal tenderness.   Musculoskeletal:         General: Normal range of motion.      Cervical back: Normal range of motion and neck supple.   Skin:     General: Skin is warm and dry.      Capillary Refill: Capillary refill takes less than 2 seconds.   Neurological:      Mental Status: He is alert and oriented to person, place, and time. Mental status is at baseline.                  Procedures:  Procedures      Medical Decision Making:      Comorbidities that affect care:    Hypertension, Smoking, Thyroid Disease    External Notes reviewed:    Previous Clinic Note      The following orders were placed and all results were independently analyzed by me:  Orders Placed This Encounter   Procedures   • XR Chest 1 View   • CT Chest With Contrast Diagnostic   • [place] Draw   • Comprehensive Metabolic Panel   • BNP   • Troponin   • CBC Auto Differential   • NPO Diet NPO Type: Strict NPO   • Undress & Gown   • Cardiac Monitoring   • Continuous Pulse Oximetry   • Vital Signs   • Oxygen Therapy- Nasal Cannula; 2 LPM; Titrate for SPO2: equal to or greater than, 92%   • ECG 12 Lead ED Triage Standing Order; SOA   • Insert Peripheral IV   • CBC & Differential   • Green Top (Gel)   • Lavender Top   • Gold Top - SST   • Light Blue Top       Medications Given in the Emergency Department:  Medications   sodium chloride 0.9 % flush 10 mL (has no administration in time range)   methylPREDNISolone sodium succinate (SOLU-Medrol) injection 125 mg (125 mg  Intravenous Given 1/22/23 1353)   ketorolac (TORADOL) injection 30 mg (30 mg Intravenous Given 1/22/23 1353)   iopamidol (ISOVUE-370) 76 % injection 100 mL (100 mL Intravenous Given 1/22/23 1543)        ED Course:    ED Course as of 01/22/23 1647   Sun Jan 22, 2023   1228 EKG:    Rhythm: Sinus rhythm  Rate: 80  Intervals: Normal  T-wave: Normal  ST Segment: Normal    EKG Comparison: 10/9/2021 similar    Interpreted by me   [NL]      ED Course User Index  [NL] Cosme Lau DO       Labs:    Lab Results (last 24 hours)     Procedure Component Value Units Date/Time    CBC & Differential [[id number]]  (Abnormal) Collected: 01/22/23 1251    Specimen: Blood Updated: 01/22/23 1300    Narrative:      The following orders were created for panel order CBC & Differential.  Procedure                               Abnormality         Status                     ---------                               -----------         ------                     CBC Auto Differential[[id number]]        Abnormal            Final result                 Please view results for these tests on the individual orders.    Comprehensive Metabolic Panel [[id number]] Collected: 01/22/23 1251    Specimen: Blood Updated: 01/22/23 1322     Glucose 85 mg/dL      BUN 12 mg/dL      Creatinine 0.93 mg/dL      Sodium 136 mmol/L      Potassium 4.3 mmol/L      Chloride 99 mmol/L      CO2 25.2 mmol/L      Calcium 9.7 mg/dL      Total Protein 8.1 g/dL      Albumin 4.2 g/dL      ALT (SGPT) 12 U/L      AST (SGOT) 11 U/L      Alkaline Phosphatase 81 U/L      Total Bilirubin 0.3 mg/dL      Globulin 3.9 gm/dL      A/G Ratio 1.1 g/dL      BUN/Creatinine Ratio 12.9     Anion Gap 11.8 mmol/L      eGFR 95.8 mL/min/1.73     Narrative:      GFR Normal >60  Chronic Kidney Disease <60  Kidney Failure <15      BNP [[id number]]  (Normal) Collected: 01/22/23 1251    Specimen: Blood Updated: 01/22/23 1319     proBNP 496.6 pg/mL     Narrative:      Among patients with dyspnea, NT-proBNP  is highly sensitive for the detection of acute congestive heart failure. In addition NT-proBNP of <300 pg/ml effectively rules out acute congestive heart failure with 99% negative predictive value.    Results may be falsely decreased if patient taking Biotin.      Troponin [[id number]]  (Normal) Collected: 01/22/23 1251    Specimen: Blood Updated: 01/22/23 1322     Troponin T <0.010 ng/mL     Narrative:      Troponin T Reference Range:  <= 0.03 ng/mL-   Negative for AMI  >0.03 ng/mL-     Abnormal for myocardial necrosis.  Clinicians would have to utilize clinical acumen, EKG, Troponin and serial changes to determine if it is an Acute Myocardial Infarction or myocardial injury due to an underlying chronic condition.       Results may be falsely decreased if patient taking Biotin.      CBC Auto Differential [[id number]]  (Abnormal) Collected: 01/22/23 1251    Specimen: Blood Updated: 01/22/23 1300     WBC 7.53 10*3/mm3      RBC 3.54 10*6/mm3      Hemoglobin 10.5 g/dL      Hematocrit 30.9 %      MCV 87.3 fL      MCH 29.7 pg      MCHC 34.0 g/dL      RDW 13.4 %      RDW-SD 43.0 fl      MPV 9.1 fL      Platelets 485 10*3/mm3      Neutrophil % 59.1 %      Lymphocyte % 26.3 %      Monocyte % 10.2 %      Eosinophil % 3.7 %      Basophil % 0.4 %      Immature Grans % 0.3 %      Neutrophils, Absolute 4.45 10*3/mm3      Lymphocytes, Absolute 1.98 10*3/mm3      Monocytes, Absolute 0.77 10*3/mm3      Eosinophils, Absolute 0.28 10*3/mm3      Basophils, Absolute 0.03 10*3/mm3      Immature Grans, Absolute 0.02 10*3/mm3      nRBC 0.0 /100 WBC            Imaging:    CT Chest With Contrast Diagnostic    Result Date: 1/22/2023  PROCEDURE: CT CHEST W CONTRAST DIAGNOSTIC  COMPARISON:  None INDICATIONS: shortness of breath  TECHNIQUE: After obtaining the patient's consent, CT images were obtained with non-ionic intravenous contrast material.   PROTOCOL:   Pulmonary embolism imaging protocol performed    RADIATION:   DLP: 451.7 mGy*cm    Automated exposure control was utilized to minimize radiation dose. CONTRAST: 75 cc Isovue 370 I.V. LABS:   eGFR: >60 ml/min/1.73m2  FINDINGS:  Lungs/pleura:  Mild central bronchial wall thickening more evident on the right.  No suspicious infiltrate.  Dependent atelectasis in the lower lobes.  Tiny pleural-based nodules in the posterior left upper lobe compatible with granulomas.  No pleural effusion  Gabbie/mediastinum:  No adenopathy.  Thoracic aorta normal in caliber.  Mild coronary calcification.  No significant pericardial effusion  Upper abdomen:  Unremarkable  Bones/soft tissues:  No acute bony abnormality       Mild bronchial wall thickening compatible with bronchitis.  No evidence of pneumonia     STEPHIE JARA MD       Electronically Signed and Approved By: STEPHIE JARA MD on 1/22/2023 at 16:02             XR Chest 1 View    Result Date: 1/22/2023  PROCEDURE: XR CHEST 1 VW  COMPARISON: [facility], , XR CHEST 1 VW, 10/09/2021, 21:30.  INDICATIONS: SOA  FINDINGS:  Lordotic positioning.  Heart size and pulmonary vasculature within normal limits.  Lungs clear.  Costophrenic angles sharp       No active cardiopulmonary disease       STEPHIE JARA MD       Electronically Signed and Approved By: STEPHIE JARA MD on 1/22/2023 at 13:03                 Differential Diagnosis and Discussion:    Chest Pain:  Based on the patient's signs and symptoms, I considered aortic dissection, myocardial infaction, pulmonary embolism, cardiac tamponade, pericarditis, pneumothorax, musculoskeletal chest pain and other differential diagnosis as an etiology of the patient's chest pain.   Cough: Differential diagnosis includes but is not limited to pneumonia, acute bronchitis, upper respiratory infection, ACE inhibitor use, allergic reaction, epiglottitis, seasonal allergies, chemical irritants, exercise-induced asthma, viral syndrome.    All labs were reviewed and analyzed by me.  All X-rays were independently  reviewed by me.  EKG was interpreted by me.  CT scan radiology interpretation was reviewed by me.    MDM  Number of Diagnoses or Management Options  Patient Progress  Patient progress: stable (16:29 EST Updated patient on results and plan for discharge. Patient expressed understanding and agreement. All questions answered at this time.)     57-year-old male patient presents with complaint of chest pain and a cough since before [place] time.  CT evaluation of the patient's chest suggest bronchitis.  His chest x-ray was clear.  Patient was given Toradol which improved his symptoms he also received IV steroids.  Patient will be discharged with a prescription for antibiotics and pain medicine and steroids.      Patient Care Considerations:    NARCOTICS: I considered prescribing opiate pain medication as an outpatient, however However the patient's symptoms more likely inflammatory is on anti-inflammatory pain medication was ordered at discharge      Consultants/Shared Management Plan:    None    Social Determinants of Health:    Patient is independent, reliable, and has access to care.       Disposition and Care Coordination:    Discharged: The patient is suitable and stable for discharge with no need for consideration of observation or admission.    I have explained discharge medications and the need for follow up with the patient/caretakers. This was also printed in the discharge instructions. Patient was discharged with the following medications and follow up:      Medication List      New Prescriptions    azithromycin 250 MG tablet  Commonly known as: Zithromax Z-Gustabo  Take 2 tablets by mouth on day 1, then 1 tablet daily on days 2-5     naproxen 500 MG EC tablet  Commonly known as: EC NAPROSYN  Take 1 tablet by mouth 2 (Two) Times a Day With Meals.     predniSONE 50 MG tablet  Commonly known as: DELTASONE  Take 1 tablet by mouth Daily.           Where to Get Your Medications      These medications were sent to  [facility] [address] [place] [address], [place] - [address] - [phone]  - [phone]   [address] [place] [address]    Phone: [phone]   · azithromycin 250 MG tablet  · naproxen 500 MG EC tablet  · predniSONE 50 MG tablet      Soheila Reyez, APRN  [address] [place] [address] [phone]    Schedule an appointment as soon as possible for a visit          Final diagnoses:   Bronchitis   Chest pain, unspecified type        ED Disposition     ED Disposition   Discharge    Condition   Stable    Comment   --             This medical record created using voice recognition software.      Documentation assistance provided by Jackelyn Fuentes acting as scribe for Cosme Lau DO. Information recorded by the scribe was done at my direction and has been verified and validated by me.        Jackelyn Fuentes  01/22/23 1347       Jackelyn Fuentes  01/22/23 1625       Cosme Lau DO  01/22/23 1627

## 2023-01-23 ENCOUNTER — TELEPHONE (OUTPATIENT)
Dept: GASTROENTEROLOGY | Facility: CLINIC | Age: 58
End: 2023-01-23
Payer: COMMERCIAL

## 2023-01-23 NOTE — TELEPHONE ENCOUNTER
Patient called the office and needed to reschedule colonoscopy. Rescheduled to next available date 05/10/2023

## 2023-01-30 LAB — QT INTERVAL: 382 MS

## 2023-04-13 ENCOUNTER — PREP FOR SURGERY (OUTPATIENT)
Dept: OTHER | Facility: HOSPITAL | Age: 58
End: 2023-04-13
Payer: COMMERCIAL

## 2023-05-05 NOTE — PAT
Left message on voicemail with arrival time of 0800. To bring picture ID and insurance card, have  over 18 to give ride home. Bring medication list but do not take any meds except inhalers that morning. Complete prep prior to arrival and call [phone] with any questions. Come to [facility].

## 2023-05-06 DIAGNOSIS — E03.9 HYPOTHYROIDISM, UNSPECIFIED TYPE: ICD-10-CM

## 2023-05-08 RX ORDER — LEVOTHYROXINE SODIUM 112 UG/1
TABLET ORAL
Qty: 30 TABLET | Refills: 0 | Status: SHIPPED | OUTPATIENT
Start: 2023-05-08

## 2023-05-08 NOTE — PAT
Patient given arrival time of 0800.          Patient instructed to bring photo ID and insurance card.  Must have a  over 18.  Instructed to bring an updated med list or bring in all current medications.  Instructed to come to the [facility] entrance on arrival.  Addressed any questions or concerns with patient.              PAT chaparrita.     Margot Ennis RN 14:45 EDT 5/8/2023

## 2023-05-10 ENCOUNTER — ANESTHESIA (OUTPATIENT)
Dept: GASTROENTEROLOGY | Facility: HOSPITAL | Age: 58
End: 2023-05-10
Payer: COMMERCIAL

## 2023-05-10 ENCOUNTER — ANESTHESIA EVENT (OUTPATIENT)
Dept: GASTROENTEROLOGY | Facility: HOSPITAL | Age: 58
End: 2023-05-10
Payer: COMMERCIAL

## 2023-05-10 ENCOUNTER — HOSPITAL ENCOUNTER (OUTPATIENT)
Facility: HOSPITAL | Age: 58
Setting detail: HOSPITAL OUTPATIENT SURGERY
Discharge: HOME | End: 2023-05-10
Attending: INTERNAL MEDICINE | Admitting: INTERNAL MEDICINE
Payer: COMMERCIAL

## 2023-05-10 VITALS
RESPIRATION RATE: 15 BRPM | OXYGEN SATURATION: 97 % | SYSTOLIC BLOOD PRESSURE: 148 MMHG | TEMPERATURE: 97.2 F | DIASTOLIC BLOOD PRESSURE: 87 MMHG | BODY MASS INDEX: 28.84 KG/M2 | WEIGHT: 195.33 LBS | HEART RATE: 55 BPM

## 2023-05-10 DIAGNOSIS — Z12.11 COLON CANCER SCREENING: ICD-10-CM

## 2023-05-10 PROCEDURE — 88305 TISSUE EXAM BY PATHOLOGIST: CPT | Performed by: INTERNAL MEDICINE

## 2023-05-10 PROCEDURE — 45380 COLONOSCOPY AND BIOPSY: CPT | Performed by: INTERNAL MEDICINE

## 2023-05-10 PROCEDURE — 25010000002 PROPOFOL 10 MG/ML EMULSION: Performed by: NURSE ANESTHETIST, CERTIFIED REGISTERED

## 2023-05-10 RX ORDER — SODIUM CHLORIDE, SODIUM LACTATE, POTASSIUM CHLORIDE, CALCIUM CHLORIDE 600; 310; 30; 20 MG/100ML; MG/100ML; MG/100ML; MG/100ML
9 INJECTION, SOLUTION INTRAVENOUS CONTINUOUS PRN
Status: DISCONTINUED | OUTPATIENT
Start: 2023-05-10 | End: 2023-05-10 | Stop reason: HOSPADM

## 2023-05-10 RX ORDER — SODIUM CHLORIDE 0.9 % (FLUSH) 0.9 %
10 SYRINGE (ML) INJECTION AS NEEDED
Status: DISCONTINUED | OUTPATIENT
Start: 2023-05-10 | End: 2023-05-10 | Stop reason: HOSPADM

## 2023-05-10 RX ORDER — LIDOCAINE HYDROCHLORIDE 20 MG/ML
INJECTION, SOLUTION EPIDURAL; INFILTRATION; INTRACAUDAL; PERINEURAL AS NEEDED
Status: DISCONTINUED | OUTPATIENT
Start: 2023-05-10 | End: 2023-05-10 | Stop reason: SURG

## 2023-05-10 RX ORDER — SODIUM CHLORIDE 9 MG/ML
40 INJECTION, SOLUTION INTRAVENOUS AS NEEDED
Status: DISCONTINUED | OUTPATIENT
Start: 2023-05-10 | End: 2023-05-10 | Stop reason: HOSPADM

## 2023-05-10 RX ORDER — SODIUM CHLORIDE 0.9 % (FLUSH) 0.9 %
10 SYRINGE (ML) INJECTION EVERY 12 HOURS SCHEDULED
Status: DISCONTINUED | OUTPATIENT
Start: 2023-05-10 | End: 2023-05-10 | Stop reason: HOSPADM

## 2023-05-10 RX ORDER — PROPOFOL 10 MG/ML
VIAL (ML) INTRAVENOUS AS NEEDED
Status: DISCONTINUED | OUTPATIENT
Start: 2023-05-10 | End: 2023-05-10 | Stop reason: SURG

## 2023-05-10 RX ADMIN — PROPOFOL 50 MG: 10 INJECTION, EMULSION INTRAVENOUS at 09:21

## 2023-05-10 RX ADMIN — PROPOFOL 200 MCG/KG/MIN: 10 INJECTION, EMULSION INTRAVENOUS at 09:21

## 2023-05-10 RX ADMIN — LIDOCAINE HYDROCHLORIDE 100 MG: 20 INJECTION, SOLUTION EPIDURAL; INFILTRATION; INTRACAUDAL; PERINEURAL at 09:21

## 2023-05-10 RX ADMIN — SODIUM CHLORIDE, POTASSIUM CHLORIDE, SODIUM LACTATE AND CALCIUM CHLORIDE: 600; 310; 30; 20 INJECTION, SOLUTION INTRAVENOUS at 09:20

## 2023-05-10 NOTE — ANESTHESIA POSTPROCEDURE EVALUATION
Patient: Simon Anthony    Procedure Summary     Date: 05/10/23 Room / Location: [facility] ENDOSCOPY 4 / [facility] ENDOSCOPY    Anesthesia Start: 0920 Anesthesia Stop: 0944    Procedure: COLONOSCOPY WITH POLYPECTOMY Diagnosis:       Colon cancer screening      (Colon cancer screening [Z12.11])    Surgeons: Noy Cordero MD Provider: Jose Guillaume MD    Anesthesia Type: general ASA Status: 2          Anesthesia Type: general    Vitals  Vitals Value Taken Time   /87 05/10/23 1000   Temp 36.2 °C (97.2 °F) 05/10/23 1000   Pulse 57 05/10/23 1002   Resp 15 05/10/23 1000   SpO2 98 % 05/10/23 1002   Vitals shown include unvalidated device data.        Post Anesthesia Care and Evaluation    Patient location during evaluation: PACU  Patient participation: complete - patient participated  Level of consciousness: awake and alert  Pain management: adequate    Airway patency: patent  Anesthetic complications: No anesthetic complications  PONV Status: none  Cardiovascular status: acceptable  Respiratory status: acceptable  Hydration status: acceptable    Comments: An Anesthesiologist personally participated in the most demanding procedures (including induction and emergence if applicable) of the anesthesia plan, and monitored the course of anesthesia administration at frequent intervals and remained physically present and available for immediate diagnosis and treatment of emergencies.

## 2023-05-10 NOTE — H&P
Pre Procedure History & Physical    Chief Complaint:   Screening colonoscopy    Subjective     HPI:   58 yo M here for screening colonoscopy.    Past Medical History:   Past Medical History:   Diagnosis Date   • Anemia    • Anxiety    • Eczema    • Hypertension    • Hypothyroidism        Past Surgical History:  Past Surgical History:   Procedure Laterality Date   • NO PAST SURGERIES         Family History:  Family History   Problem Relation Age of Onset   • Colon cancer Father    • Other Father         Colon Neoplasm   • Other Sister         Breast Neoplasm   • Diabetes type I Paternal Grandmother    • Arthritis Paternal Grandmother        Social History:   reports that he has been smoking cigarettes. He started smoking about 42 years ago. He has a 48.00 pack-year smoking history. He has never used smokeless tobacco. He reports that he does not currently use alcohol. He reports that he does not currently use drugs.    Medications:   Medications Prior to Admission   Medication Sig Dispense Refill Last Dose   • azithromycin (Zithromax Z-Gustabo) 250 MG tablet Take 2 tablets by mouth on day 1, then 1 tablet daily on days 2-5 6 tablet 0    • busPIRone (BUSPAR) 10 MG tablet Take 1 tablet by mouth 2 (Two) Times a Day. 180 tablet 1    • Crisaborole (Eucrisa) 2 % ointment Apply 1 each topically 2 (Two) Times a Day. 100 g 0    • Emollient (Aquaphor Advanced Therapy) ointment ointment Apply 1 application topically to the appropriate area as directed 2 (Two) Times a Day As Needed (skin dryness/itching). 396 g 0    • escitalopram (LEXAPRO) 20 MG tablet Take 1 tablet by mouth Daily. 90 tablet 1    • hydroCHLOROthiazide (HYDRODIURIL) 12.5 MG tablet Take 1 tablet by mouth Daily. 90 tablet 1    • levothyroxine (SYNTHROID, LEVOTHROID) 112 MCG tablet Take 1 tablet by mouth once daily 30 tablet 0    • lisinopril (PRINIVIL,ZESTRIL) 20 MG tablet Take 1 tablet by mouth 2 (Two) Times a Day. 180 tablet 1    • naproxen (EC NAPROSYN) 500 MG EC  tablet Take 1 tablet by mouth 2 (Two) Times a Day With Meals. 20 tablet 0    • predniSONE (DELTASONE) 50 MG tablet Take 1 tablet by mouth Daily. 5 tablet 0    • sodium-potassium-magnesium sulfates (Suprep Bowel Prep Kit) 17.5-3.13-1.6 GM/177ML solution oral solution Take 1 bottle by mouth Every 12 (Twelve) Hours. 354 mL 0    • triamcinolone (KENALOG) 0.1 % lotion Apply  topically to the appropriate area as directed 2 (Two) Times a Day. 60 mL 1        Allergies:  Patient has no known allergies.    ROS:    Pertinent items are noted in HPI     Objective     Blood pressure (!) 192/90, pulse 65, temperature 97.2 °F (36.2 °C), temperature source Temporal, resp. rate 20, weight 88.6 kg (195 lb 5.2 oz), SpO2 99 %.    Physical Exam   Constitutional: Pt is oriented to person, place, and time and well-developed, well-nourished, and in no distress.   Mouth/Throat: Oropharynx is clear and moist.   Neck: Normal range of motion.   Cardiovascular: Normal rate, regular rhythm and normal heart sounds.    Pulmonary/Chest: Effort normal and breath sounds normal.   Abdominal: Soft. Nontender  Skin: Skin is warm and dry.   Psychiatric: Mood, memory, affect and judgment normal.     Assessment & Plan     Diagnosis:  Screening colonoscopy    Anticipated Surgical Procedure:  Colonoscopy    The risks, benefits, and alternatives of this procedure have been discussed with the patient or the responsible party- the patient understands and agrees to proceed.

## 2023-05-10 NOTE — ANESTHESIA PREPROCEDURE EVALUATION
Anesthesia Evaluation     NPO Solid Status: > 6 hours  NPO Liquid Status: > 2 hours           Airway   Mallampati: II  TM distance: >3 FB  Neck ROM: full  Dental    (+) upper dentures and lower dentures    Pulmonary - normal exam   (+) a smoker Current,   Cardiovascular - normal exam    (+) hypertension,       Neuro/Psych  (+) psychiatric history Anxiety,    GI/Hepatic/Renal/Endo    (+)   thyroid problem hypothyroidism    Musculoskeletal (-) negative ROS    Abdominal  - normal exam   Substance History - negative use     OB/GYN negative ob/gyn ROS         Other   blood dyscrasia anemia,                     Anesthesia Plan    ASA 2     general     intravenous induction     Anesthetic plan, risks, benefits, and alternatives have been provided, discussed and informed consent has been obtained with: patient.    Plan discussed with CRNA.        CODE STATUS:

## 2023-05-11 ENCOUNTER — TELEPHONE (OUTPATIENT)
Dept: GASTROENTEROLOGY | Facility: CLINIC | Age: 58
End: 2023-05-11
Payer: COMMERCIAL

## 2023-05-11 LAB
CYTO UR: NORMAL
LAB AP CASE REPORT: NORMAL
LAB AP CLINICAL INFORMATION: NORMAL
PATH REPORT.FINAL DX SPEC: NORMAL
PATH REPORT.GROSS SPEC: NORMAL

## 2023-05-11 NOTE — TELEPHONE ENCOUNTER
----- Message from VIJAY Castro sent at 5/11/2023  9:47 AM EDT -----  Please place in recall for repeat colonoscopy in 5 years.  Send letter to patient and PCP.

## 2023-05-26 ENCOUNTER — OFFICE VISIT (OUTPATIENT)
Dept: FAMILY MEDICINE CLINIC | Facility: CLINIC | Age: 58
End: 2023-05-26
Payer: COMMERCIAL

## 2023-05-26 VITALS
HEIGHT: 69 IN | OXYGEN SATURATION: 98 % | WEIGHT: 196 LBS | DIASTOLIC BLOOD PRESSURE: 82 MMHG | BODY MASS INDEX: 29.03 KG/M2 | SYSTOLIC BLOOD PRESSURE: 162 MMHG | HEART RATE: 89 BPM | TEMPERATURE: 96.9 F

## 2023-05-26 DIAGNOSIS — Z12.5 PROSTATE CANCER SCREENING: ICD-10-CM

## 2023-05-26 DIAGNOSIS — I10 ESSENTIAL HYPERTENSION: Primary | ICD-10-CM

## 2023-05-26 DIAGNOSIS — E03.9 HYPOTHYROIDISM, UNSPECIFIED TYPE: ICD-10-CM

## 2023-05-26 DIAGNOSIS — F41.9 ANXIETY: ICD-10-CM

## 2023-05-26 PROCEDURE — 99214 OFFICE O/P EST MOD 30 MIN: CPT | Performed by: NURSE PRACTITIONER

## 2023-05-26 RX ORDER — ESCITALOPRAM OXALATE 20 MG/1
20 TABLET ORAL DAILY
Qty: 90 TABLET | Refills: 1 | Status: SHIPPED | OUTPATIENT
Start: 2023-05-26

## 2023-05-26 RX ORDER — BUSPIRONE HYDROCHLORIDE 10 MG/1
10 TABLET ORAL 2 TIMES DAILY
Qty: 180 TABLET | Refills: 1 | Status: CANCELLED | OUTPATIENT
Start: 2023-05-26

## 2023-05-26 RX ORDER — LEVOTHYROXINE SODIUM 112 UG/1
112 TABLET ORAL DAILY
Qty: 90 TABLET | Refills: 1 | Status: SHIPPED | OUTPATIENT
Start: 2023-05-26

## 2023-05-26 RX ORDER — LISINOPRIL 20 MG/1
20 TABLET ORAL 2 TIMES DAILY
Qty: 180 TABLET | Refills: 1 | Status: SHIPPED | OUTPATIENT
Start: 2023-05-26

## 2023-05-26 RX ORDER — HYDROCHLOROTHIAZIDE 12.5 MG/1
12.5 TABLET ORAL DAILY
Qty: 90 TABLET | Refills: 1 | Status: SHIPPED | OUTPATIENT
Start: 2023-05-26

## 2023-05-26 NOTE — PROGRESS NOTES
Chief Complaint  Hypertension (Refills and labs)    Subjective            Simon Anthony presents to [facility]  History of Present Illness     Simon presents to the office today for follow up on chronic health conditions and medication refills.     He ran out of his BP medication yesterday - BP is elevated on exam today, 162/82. He is not having any chest pain or palpitations. No c/o headaches, dizziness, shortness of breath, or LE edema. No c/o dry cough with lisinopril. He states he does not take the water anymore. He switched from nights to day and he got off track with his BP medication, so what he did was not take it.     He continues to take Lexapro 20mg daily for anxiety but he is not taking BuSpar. He feels like it was giving him a headache. He stopped taking it a while back. He does feel like his anxiety is well managed for now with Lexapro.     He is taking levothyroxine daily - 112 mcg - he missed his appt with endocrinology d/t work conflict. He called to cancel it but he did not reschedule it. No change in hair, skin, or nails.     He is using aquaphor for his eczema - he is not using Eucrisa. He states that the Aquaphor is helping and keeping it from getting itchy.     He had his colonoscopy last month.  He had a polyp removed which was noncancerous.  They told him to come back in 5 years for a repeat colonoscopy.    PHQ-2 Total Score: 0      Past Medical History:   Diagnosis Date   • Anemia    • Anxiety    • Eczema    • Hypertension    • Hypothyroidism        Allergies   Allergen Reactions   • Buspar [Buspirone] Headache        Past Surgical History:   Procedure Laterality Date   • COLONOSCOPY N/A 5/10/2023    Procedure: COLONOSCOPY WITH POLYPECTOMY;  Surgeon: Noy Cordero MD;  Location: [facility] ENDOS[facility];  Service: Gastroenterology;  Laterality: N/A;  COLON POLYP AND HEMORRHOIDS   • NO PAST SURGERIES          Social History     Tobacco Use   • Smoking  status: Every Day     Packs/day: 1.50     Years: 32.00     Pack years: 48.00     Types: Cigarettes     Start date: 8/24/1980     Passive exposure: Current   • Smokeless tobacco: Never   Vaping Use   • Vaping Use: Never used   Substance Use Topics   • Alcohol use: Not Currently   • Drug use: Not Currently     Comment: Former       Family History   Problem Relation Age of Onset   • Hypertension Mother    • Skin cancer Mother    • Colon cancer Father    • Other Father         Colon Neoplasm   • Other Sister         Breast Neoplasm   • Diabetes type I Paternal Grandmother    • Arthritis Paternal Grandmother         Health Maintenance Due   Topic Date Due   • COVID-19 Vaccine (1) Never done   • Pneumococcal Vaccine 0-64 (1 - PCV) Never done   • ANNUAL PHYSICAL  Never done        Current Outpatient Medications on File Prior to Visit   Medication Sig   • Emollient (Aquaphor Advanced Therapy) ointment ointment Apply 1 application topically to the appropriate area as directed 2 (Two) Times a Day As Needed (skin dryness/itching).   • naproxen (EC NAPROSYN) 500 MG EC tablet Take 1 tablet by mouth 2 (Two) Times a Day With Meals.   • [DISCONTINUED] busPIRone (BUSPAR) 10 MG tablet Take 1 tablet by mouth 2 (Two) Times a Day.   • [DISCONTINUED] escitalopram (LEXAPRO) 20 MG tablet Take 1 tablet by mouth Daily.   • [DISCONTINUED] hydroCHLOROthiazide (HYDRODIURIL) 12.5 MG tablet Take 1 tablet by mouth Daily.   • [DISCONTINUED] levothyroxine (SYNTHROID, LEVOTHROID) 112 MCG tablet Take 1 tablet by mouth once daily   • [DISCONTINUED] lisinopril (PRINIVIL,ZESTRIL) 20 MG tablet Take 1 tablet by mouth 2 (Two) Times a Day.   • [DISCONTINUED] triamcinolone (KENALOG) 0.1 % lotion Apply  topically to the appropriate area as directed 2 (Two) Times a Day.   • [DISCONTINUED] Crisaborole (Eucrisa) 2 % ointment Apply 1 each topically 2 (Two) Times a Day. (Patient not taking: Reported on 5/26/2023)     No current facility-administered medications on  "file prior to visit.       Immunization History   Administered Date(s) Administered   • Flu Vaccine Intradermal Quad 18-64YR 12/01/2016   • FluLaval/Fluzone >6mos 08/07/2017, 08/10/2018, 10/14/2022   • Fluzone Quad >6mos (Multi-dose) 10/21/2019   • Hepatitis A 06/18/2018   • Hepatitis B Adult/Adolescent IM 12/29/2016, 03/03/2017   • Influenza, Unspecified 10/21/2019, 10/07/2020   • Shingrix 03/10/2018, 06/18/2018   • Tdap 12/01/2016       Review of Systems     Objective     /82 (BP Location: Left arm)   Pulse 89   Temp 96.9 °F (36.1 °C)   Ht 175.3 cm (69\")   Wt 88.9 kg (196 lb)   SpO2 98%   BMI 28.94 kg/m²       Physical Exam  Vitals reviewed.   Constitutional:       General: He is not in acute distress.     Appearance: Normal appearance. He is well-developed.   HENT:      Head: Normocephalic and atraumatic.   Eyes:      General: No scleral icterus.     Conjunctiva/sclera: Conjunctivae normal.      Pupils: Pupils are equal, round, and reactive to light.   Neck:      Thyroid: No thyroid mass, thyromegaly or thyroid tenderness.      Vascular: No carotid bruit.      Trachea: Trachea normal.   Cardiovascular:      Rate and Rhythm: Normal rate and regular rhythm.      Pulses: Normal pulses.      Heart sounds: No murmur heard.  Pulmonary:      Effort: Pulmonary effort is normal.      Breath sounds: Normal breath sounds. No wheezing or rhonchi.   Abdominal:      General: Bowel sounds are normal. There is no distension.      Palpations: Abdomen is soft. There is no mass.      Tenderness: There is no abdominal tenderness.   Musculoskeletal:         General: Normal range of motion.      Right lower leg: No edema.      Left lower leg: No edema.   Lymphadenopathy:      Cervical: No cervical adenopathy.   Skin:     General: Skin is warm and dry.   Neurological:      Mental Status: He is alert and oriented to person, place, and time.   Psychiatric:         Mood and Affect: Mood and affect normal.         Behavior: " Behavior normal.         Thought Content: Thought content normal.         Judgment: Judgment normal.         Result Review :     The following data was reviewed by: VIJAY Roldan on 05/26/2023:    CMP        10/14/2022    11:58 1/22/2023    12:51   CMP   Glucose 85   85     BUN 17   12     Creatinine 1.01   0.93     EGFR 86.7   95.8     Sodium 135   136     Potassium 4.9   4.3     Chloride 102   99     Calcium 9.7   9.7     Total Protein 7.1   8.1     Albumin 4.40   4.2     Globulin 2.7   3.9     Total Bilirubin 0.3   0.3     Alkaline Phosphatase 71   81     AST (SGOT) 12   11     ALT (SGPT) 16   12     Albumin/Globulin Ratio 1.6   1.1     BUN/Creatinine Ratio 16.8   12.9     Anion Gap 8.2   11.8       CBC        10/14/2022    11:58 1/22/2023    12:51   CBC   WBC 7.43   7.53     RBC 3.99   3.54     Hemoglobin 12.2   10.5     Hematocrit 35.9   30.9     MCV 90.0   87.3     MCH 30.6   29.7     MCHC 34.0   34.0     RDW 14.3   13.4     Platelets 352   485       Lipid Panel        10/14/2022    11:58   Lipid Panel   Total Cholesterol 240     Triglycerides 174     HDL Cholesterol 42     VLDL Cholesterol 32     LDL Cholesterol  166     LDL/HDL Ratio 3.89       TSH        6/1/2022    15:55 10/14/2022    11:58   TSH   TSH 4.470   1.190         PSA        6/1/2022    15:55   PSA   PSA 0.586         Data reviewed: GI studies :   COLONOSCOPY (05/10/2023 09:19)  Tissue Pathology Exam (05/10/2023 09:36)  CT Chest With Contrast Diagnostic (01/22/2023 15:42)           Assessment and Plan      Diagnoses and all orders for this visit:    1. Essential hypertension (Primary)  -     lisinopril (PRINIVIL,ZESTRIL) 20 MG tablet; Take 1 tablet by mouth 2 (Two) Times a Day.  Dispense: 180 tablet; Refill: 1  -     hydroCHLOROthiazide (HYDRODIURIL) 12.5 MG tablet; Take 1 tablet by mouth Daily.  Dispense: 90 tablet; Refill: 1  -     CBC Auto Differential; Future  -     Comprehensive Metabolic Panel; Future  -     Lipid Panel;  Future  -     Microalbumin / Creatinine Urine Ratio - Urine, Clean Catch; Future    2. Hypothyroidism, unspecified type  -     levothyroxine (SYNTHROID, LEVOTHROID) 112 MCG tablet; Take 1 tablet by mouth Daily.  Dispense: 90 tablet; Refill: 1  -     TSH+Free T4; Future    3. Anxiety  -     escitalopram (LEXAPRO) 20 MG tablet; Take 1 tablet by mouth Daily.  Dispense: 90 tablet; Refill: 1    4. Prostate cancer screening  -     PSA Screen; Future            Follow Up     Return in about 6 months (around 11/26/2023) for Next scheduled follow up, medication refills and fasting labs.     Patient advised that I do want him to continue to take lisinopril 20 mg twice daily, in addition to HCTZ 12.5 mg once daily.  I will continue him on Lexapro, but will discontinue the BuSpar as he is not taking due to side effect of headache.  Continue current dose of levothyroxine.  I will adjust if needed based on labs.    Patient was given instructions and counseling regarding his condition or for health maintenance advice. Please see specific information pulled into the AVS if appropriate.

## 2023-06-07 ENCOUNTER — TELEPHONE (OUTPATIENT)
Dept: FAMILY MEDICINE CLINIC | Facility: CLINIC | Age: 58
End: 2023-06-07
Payer: COMMERCIAL

## 2023-12-01 DIAGNOSIS — Z91.89 ATHEROSCLEROTIC CARDIOVASCULAR DISEASE 10 YEAR RISK GREATER THAN 30% USING 2013 ACC/AHA CALCULATOR: ICD-10-CM

## 2023-12-01 DIAGNOSIS — F41.9 ANXIETY: ICD-10-CM

## 2023-12-01 DIAGNOSIS — E78.5 DYSLIPIDEMIA, GOAL LDL BELOW 70: ICD-10-CM

## 2023-12-01 DIAGNOSIS — I10 ESSENTIAL HYPERTENSION: ICD-10-CM

## 2023-12-01 DIAGNOSIS — E03.9 HYPOTHYROIDISM, UNSPECIFIED TYPE: ICD-10-CM

## 2023-12-01 RX ORDER — HYDROCHLOROTHIAZIDE 12.5 MG/1
12.5 TABLET ORAL DAILY
Qty: 90 TABLET | Refills: 0 | Status: SHIPPED | OUTPATIENT
Start: 2023-12-01

## 2023-12-01 RX ORDER — LISINOPRIL 20 MG/1
20 TABLET ORAL 2 TIMES DAILY
Qty: 180 TABLET | Refills: 0 | Status: SHIPPED | OUTPATIENT
Start: 2023-12-01

## 2023-12-01 RX ORDER — ESCITALOPRAM OXALATE 20 MG/1
20 TABLET ORAL DAILY
Qty: 90 TABLET | Refills: 0 | Status: SHIPPED | OUTPATIENT
Start: 2023-12-01

## 2023-12-01 RX ORDER — ATORVASTATIN CALCIUM 40 MG/1
40 TABLET, FILM COATED ORAL DAILY
Qty: 90 TABLET | Refills: 0 | Status: SHIPPED | OUTPATIENT
Start: 2023-12-01

## 2023-12-01 RX ORDER — LEVOTHYROXINE SODIUM 112 UG/1
112 TABLET ORAL DAILY
Qty: 90 TABLET | Refills: 0 | Status: SHIPPED | OUTPATIENT
Start: 2023-12-01

## 2023-12-08 ENCOUNTER — OFFICE VISIT (OUTPATIENT)
Dept: FAMILY MEDICINE CLINIC | Facility: CLINIC | Age: 58
End: 2023-12-08
Payer: COMMERCIAL

## 2023-12-08 VITALS
SYSTOLIC BLOOD PRESSURE: 140 MMHG | BODY MASS INDEX: 28.44 KG/M2 | HEART RATE: 104 BPM | DIASTOLIC BLOOD PRESSURE: 80 MMHG | OXYGEN SATURATION: 98 % | WEIGHT: 192 LBS | TEMPERATURE: 97.3 F | HEIGHT: 69 IN

## 2023-12-08 DIAGNOSIS — Z00.00 ANNUAL PHYSICAL EXAM: Primary | ICD-10-CM

## 2023-12-08 DIAGNOSIS — F17.218 CIGARETTE NICOTINE DEPENDENCE WITH OTHER NICOTINE-INDUCED DISORDER: ICD-10-CM

## 2023-12-08 DIAGNOSIS — Z23 NEED FOR INFLUENZA VACCINATION: ICD-10-CM

## 2023-12-08 DIAGNOSIS — Z23 NEED FOR PNEUMOCOCCAL 20-VALENT CONJUGATE VACCINATION: ICD-10-CM

## 2023-12-08 DIAGNOSIS — Z12.2 SCREENING FOR LUNG CANCER: ICD-10-CM

## 2023-12-08 DIAGNOSIS — Z71.85 VACCINE COUNSELING: ICD-10-CM

## 2023-12-08 DIAGNOSIS — J20.9 SUBACUTE BRONCHITIS: ICD-10-CM

## 2023-12-08 LAB
ALBUMIN UR-MCNC: <1.2 MG/DL
BILIRUB UR QL STRIP: NEGATIVE
CLARITY UR: CLEAR
COLOR UR: YELLOW
CREAT UR-MCNC: 31 MG/DL
GLUCOSE UR STRIP-MCNC: NEGATIVE MG/DL
HGB UR QL STRIP.AUTO: NEGATIVE
KETONES UR QL STRIP: NEGATIVE
LEUKOCYTE ESTERASE UR QL STRIP.AUTO: NEGATIVE
MICROALBUMIN/CREAT UR: NORMAL MG/G{CREAT}
NITRITE UR QL STRIP: NEGATIVE
PH UR STRIP.AUTO: 7 [PH] (ref 5–8)
PROT UR QL STRIP: NEGATIVE
SP GR UR STRIP: 1.01 (ref 1–1.03)
UROBILINOGEN UR QL STRIP: NORMAL

## 2023-12-08 PROCEDURE — 82570 ASSAY OF URINE CREATININE: CPT | Performed by: NURSE PRACTITIONER

## 2023-12-08 PROCEDURE — 81003 URINALYSIS AUTO W/O SCOPE: CPT | Performed by: NURSE PRACTITIONER

## 2023-12-08 PROCEDURE — 82043 UR ALBUMIN QUANTITATIVE: CPT | Performed by: NURSE PRACTITIONER

## 2023-12-08 RX ORDER — METHYLPREDNISOLONE 4 MG/1
TABLET ORAL
Qty: 1 EACH | Refills: 0 | Status: SHIPPED | OUTPATIENT
Start: 2023-12-08

## 2023-12-08 RX ORDER — AZITHROMYCIN 250 MG/1
TABLET, FILM COATED ORAL
Qty: 6 TABLET | Refills: 0 | Status: SHIPPED | OUTPATIENT
Start: 2023-12-08

## 2023-12-08 NOTE — PROGRESS NOTES
Chief Complaint  Annual Exam (Annual Wellness)    History of Present Illness  Simon Anthony is a 58 y.o. male who presents to [facility] with a past medical history of    Past Medical History:   Diagnosis Date    Anemia     Anxiety     Eczema     Hypertension     Hypothyroidism      Annual physical exam -     Last PSA: 2023 - normal    Last CRC screenin/10/2023 - 3mm polyp in the rectum (hyperplastic) and hemorrhoids - recall in 5 years    Last lung cancer screening 2022. Mild bronchial wall thickening compatible with bronchitisWas noted, but no evidence of pneumonia.  He continues to smoke, currently smoking approximately 2 packs/day.  He has been smoking since approximately .  Over the course of that time he has averaged approximately 1-1/2 packs/day as he has ranged between 1 pack/day and 2 pack/day.  He does currently endorse cough and chest congestion.  He states that his entire household was sick with an upper respiratory infection approximately 6 weeks ago.  He did not seek care for illness, but rather has been taking over-the-counter medications.  He denies any fever, chills, or bodyaches.  No significant wheezing.  He does have a rescue inhaler at home if he needs it.  Primary complaints are that of cough and sputum production.    Blood pressure is mildly elevated on exam today.  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  He feels that is the reason that his blood pressure is elevated.  He is taking his medication as prescribed, lisinopril and HCTZ.  He denies chest pain, palpitations, headaches, dizziness, or shortness of breath.  No lower extremity edema.    He reports being compliant with taking levothyroxine, 112 mcg daily.  No change in hair, skin, or nails.    Anxiety is stable with Lexapro.  He did run out of it for a few days and within that time he did feel agitation coming back on,  "especially when at work.  He did have an episode at home where he \"blew up\" without being on it, so he notes that he needs to stay on it.  Denies depression, homicidal ideations or suicidal ideations.  He denies any AVH.      Objective   Vital Signs:   Vitals:    12/08/23 0928 12/08/23 0957   BP: 152/82 140/80   BP Location: Left arm Left arm   Patient Position:  Sitting   Cuff Size:  Adult   Pulse: 104    Temp: 97.3 °F (36.3 °C)    SpO2: 98%    Weight: 87.1 kg (192 lb)    Height: 175.3 cm (69\")      Body mass index is 28.35 kg/m².    Wt Readings from Last 3 Encounters:   12/08/23 87.1 kg (192 lb)   05/26/23 88.9 kg (196 lb)   05/10/23 88.6 kg (195 lb 5.2 oz)     BP Readings from Last 3 Encounters:   12/08/23 140/80   05/26/23 162/82   05/10/23 148/87       Health Maintenance   Topic Date Due    LUNG CANCER SCREENING  01/22/2024    BMI FOLLOWUP  05/26/2024    LIPID PANEL  06/23/2024    ANNUAL PHYSICAL  12/08/2024    TDAP/TD VACCINES (2 - Td or Tdap) 12/01/2026    COLORECTAL CANCER SCREENING  05/10/2028    HEPATITIS C SCREENING  Completed    Pneumococcal Vaccine 0-64  Completed    INFLUENZA VACCINE  Completed    ZOSTER VACCINE  Completed    COVID-19 Vaccine  Discontinued       Physical Exam  Vitals reviewed.   Constitutional:       General: He is not in acute distress.     Appearance: He is well-developed and overweight.   HENT:      Head: Normocephalic and atraumatic.   Eyes:      General: No scleral icterus.        Right eye: No discharge.         Left eye: No discharge.      Extraocular Movements: Extraocular movements intact.      Conjunctiva/sclera: Conjunctivae normal.   Neck:      Thyroid: No thyroid mass, thyromegaly or thyroid tenderness.      Vascular: No carotid bruit.      Trachea: Trachea normal.   Cardiovascular:      Rate and Rhythm: Normal rate and regular rhythm.      Pulses: Normal pulses.      Heart sounds: No murmur heard.  Pulmonary:      Effort: Pulmonary effort is normal. No respiratory " distress.      Breath sounds: Rhonchi present. No wheezing or rales.   Abdominal:      General: Bowel sounds are normal. There is no distension.      Palpations: Abdomen is soft. There is no mass.      Tenderness: There is no abdominal tenderness. There is no right CVA tenderness or left CVA tenderness.   Musculoskeletal:         General: Normal range of motion.      Cervical back: Normal range of motion and neck supple. No tenderness.      Right lower leg: No edema.      Left lower leg: No edema.   Lymphadenopathy:      Cervical: No cervical adenopathy.   Skin:     General: Skin is warm and dry.   Neurological:      Mental Status: He is alert and oriented to person, place, and time.   Psychiatric:         Mood and Affect: Mood and affect normal.         Behavior: Behavior normal.         Thought Content: Thought content normal.         Judgment: Judgment normal.         Result Review :  The following data was reviewed by: VIJAY Roldan on 12/08/2023:    No visits with results within 1 Month(s) from this visit.   Latest known visit with results is:   Clinical Support on 06/23/2023   Component Date Value    WBC 06/23/2023 6.96     RBC 06/23/2023 4.38     Hemoglobin 06/23/2023 12.6 (L)     Hematocrit 06/23/2023 37.0 (L)     MCV 06/23/2023 84.5     MCH 06/23/2023 28.8     MCHC 06/23/2023 34.1     RDW 06/23/2023 13.5     RDW-SD 06/23/2023 41.4     MPV 06/23/2023 10.4     Platelets 06/23/2023 317     Neutrophil % 06/23/2023 54.2     Lymphocyte % 06/23/2023 31.6     Monocyte % 06/23/2023 8.9     Eosinophil % 06/23/2023 4.7     Basophil % 06/23/2023 0.3     Immature Grans % 06/23/2023 0.3     Neutrophils, Absolute 06/23/2023 3.77     Lymphocytes, Absolute 06/23/2023 2.20     Monocytes, Absolute 06/23/2023 0.62     Eosinophils, Absolute 06/23/2023 0.33     Basophils, Absolute 06/23/2023 0.02     Immature Grans, Absolute 06/23/2023 0.02     nRBC 06/23/2023 0.0     Glucose 06/23/2023 106 (H)     BUN 06/23/2023 16      Creatinine 06/23/2023 1.15     Sodium 06/23/2023 136     Potassium 06/23/2023 4.1     Chloride 06/23/2023 101     CO2 06/23/2023 24.0     Calcium 06/23/2023 9.7     Total Protein 06/23/2023 7.4     Albumin 06/23/2023 4.3     ALT (SGPT) 06/23/2023 15     AST (SGOT) 06/23/2023 14     Alkaline Phosphatase 06/23/2023 79     Total Bilirubin 06/23/2023 <0.2     Globulin 06/23/2023 3.1     A/G Ratio 06/23/2023 1.4     BUN/Creatinine Ratio 06/23/2023 13.9     Anion Gap 06/23/2023 11.0     eGFR 06/23/2023 74.2     Total Cholesterol 06/23/2023 210 (H)     Triglycerides 06/23/2023 160 (H)     HDL Cholesterol 06/23/2023 30 (L)     LDL Cholesterol  06/23/2023 151 (H)     VLDL Cholesterol 06/23/2023 29     LDL/HDL Ratio 06/23/2023 4.93     TSH 06/23/2023 0.335     Free T4 06/23/2023 1.27     PSA 06/23/2023 0.821     Microalbumin/Creatinine * 06/23/2023      Creatinine, Urine 06/23/2023 21.5     Microalbumin, Urine 06/23/2023 <1.2      COLONOSCOPY (05/10/2023 09:19)   Tissue Pathology Exam (05/10/2023 09:36)     Procedures        Assessment and Plan   Diagnoses and all orders for this visit:    1. Annual physical exam (Primary)  -     CBC Auto Differential  -     Comprehensive Metabolic Panel  -     Lipid Panel  -     TSH+Free T4  -     Microalbumin / Creatinine Urine Ratio - Urine, Clean Catch  -     Urinalysis With Microscopic If Indicated (No Culture) - Urine, Clean Catch    2. Vaccine counseling    3. Need for influenza vaccination  -     Pneumococcal Conjugate Vaccine 20-Valent All  -     Fluzone (or Fluarix & Flulaval for VFC) >6mos    4. Need for pneumococcal 20-valent conjugate vaccination    5. Screening for lung cancer  -      CT Chest Low Dose Cancer Screening WO; Future    6. Cigarette nicotine dependence with other nicotine-induced disorder  -      CT Chest Low Dose Cancer Screening WO; Future    7. Subacute bronchitis  -     azithromycin (Zithromax Z-Gustabo) 250 MG tablet; Take 2 tablets by mouth on day 1, then 1  tablet daily on days 2-5  Dispense: 6 tablet; Refill: 0  -     methylPREDNISolone (MEDROL) 4 MG dose pack; Take as directed on package instructions.  Dispense: 1 each; Refill: 0                  FOLLOW UP  Return for follow up pending outcome of labs.    He is not fasting, so he will need to return for fasting labs early next week.  We will go ahead and get his urine test today.    Annual physical exam encouraged.  Age-appropriate screening tests discussed.  He is up-to-date on PSA and colorectal cancer screening.  He will be due for lung cancer screening in January.  Follow a healthy diet and exercise.    He does have chronic nicotine dependence but declines smoking cessation intervention today.    In regards to his recent upper respiratory infection, he most likely has a subacute bronchitis.  I will treat with Z-Gustabo and steroid Dosepak.  If no improvement then he will follow-up for chest x-ray.    Patient was given instructions and counseling regarding his condition or for health maintenance advice. Please see specific information pulled into the AVS if appropriate.       Soheila Reyez, APRN  12/08/23  10:09 EST    CURRENT & DISCONTINUED MEDICATIONS  Current Outpatient Medications   Medication Instructions    atorvastatin (LIPITOR) 40 mg, Oral, Daily    azithromycin (Zithromax Z-Gustabo) 250 MG tablet Take 2 tablets by mouth on day 1, then 1 tablet daily on days 2-5    Emollient (Aquaphor Advanced Therapy) ointment ointment 1 application , Topical, 2 Times Daily PRN    escitalopram (LEXAPRO) 20 mg, Oral, Daily    hydroCHLOROthiazide (HYDRODIURIL) 12.5 mg, Oral, Daily    levothyroxine (SYNTHROID, LEVOTHROID) 112 mcg, Oral, Daily    lisinopril (PRINIVIL,ZESTRIL) 20 mg, Oral, 2 Times Daily    methylPREDNISolone (MEDROL) 4 MG dose pack Take as directed on package instructions.       Medications Discontinued During This Encounter   Medication Reason    naproxen (EC NAPROSYN) 500 MG EC tablet *Therapy completed

## 2024-03-05 DIAGNOSIS — I10 ESSENTIAL HYPERTENSION: ICD-10-CM

## 2024-03-05 DIAGNOSIS — F41.9 ANXIETY: ICD-10-CM

## 2024-03-05 DIAGNOSIS — E78.5 DYSLIPIDEMIA, GOAL LDL BELOW 70: ICD-10-CM

## 2024-03-05 DIAGNOSIS — Z91.89 ATHEROSCLEROTIC CARDIOVASCULAR DISEASE 10 YEAR RISK GREATER THAN 30% USING 2013 ACC/AHA CALCULATOR: ICD-10-CM

## 2024-03-05 DIAGNOSIS — E03.9 HYPOTHYROIDISM, UNSPECIFIED TYPE: ICD-10-CM

## 2024-03-05 NOTE — TELEPHONE ENCOUNTER
Caller: Simon Anthony FRANKLIN    Relationship: Self    Best call back number: [phone]     Requested Prescriptions:   Requested Prescriptions     Pending Prescriptions Disp Refills    escitalopram (LEXAPRO) 20 MG tablet 90 tablet 0     Sig: Take 1 tablet by mouth Daily.    atorvastatin (LIPITOR) 40 MG tablet 90 tablet 0     Sig: Take 1 tablet by mouth Daily.    hydroCHLOROthiazide 12.5 MG tablet 90 tablet 0     Sig: Take 1 tablet by mouth Daily.    lisinopril (PRINIVIL,ZESTRIL) 20 MG tablet 180 tablet 0     Sig: Take 1 tablet by mouth 2 (Two) Times a Day.    levothyroxine (SYNTHROID, LEVOTHROID) 112 MCG tablet 90 tablet 0     Sig: Take 1 tablet by mouth Daily.        Pharmacy where request should be sent: [address] [phone] [facility] [phone] FX     Last office visit with prescribing clinician: 12/8/2023   Last telemedicine visit with prescribing clinician: Visit date not found   Next office visit with prescribing clinician: Visit date not found     Additional details provided by patient: PATIENT IS OUT AND ALMOST OUT OF ALL MEDICATIONS    Does the patient have less than a 3 day supply:  [x] Yes  [] No      Ivan Galvez Rep   03/05/24 10:58 EST

## 2024-03-06 ENCOUNTER — CLINICAL SUPPORT (OUTPATIENT)
Dept: FAMILY MEDICINE CLINIC | Facility: CLINIC | Age: 59
End: 2024-03-06
Payer: COMMERCIAL

## 2024-03-06 DIAGNOSIS — Z79.899 DRUG THERAPY: Primary | ICD-10-CM

## 2024-03-06 LAB
ALBUMIN SERPL-MCNC: 4.5 G/DL (ref 3.5–5.2)
ALBUMIN/GLOB SERPL: 1.6 G/DL
ALP SERPL-CCNC: 91 U/L (ref 39–117)
ALT SERPL W P-5'-P-CCNC: 31 U/L (ref 1–41)
ANION GAP SERPL CALCULATED.3IONS-SCNC: 14.6 MMOL/L (ref 5–15)
AST SERPL-CCNC: 21 U/L (ref 1–40)
BASOPHILS # BLD AUTO: 0.04 10*3/MM3 (ref 0–0.2)
BASOPHILS NFR BLD AUTO: 0.5 % (ref 0–1.5)
BILIRUB SERPL-MCNC: <0.2 MG/DL (ref 0–1.2)
BUN SERPL-MCNC: 12 MG/DL (ref 6–20)
BUN/CREAT SERPL: 10.4 (ref 7–25)
CALCIUM SPEC-SCNC: 9.2 MG/DL (ref 8.6–10.5)
CHLORIDE SERPL-SCNC: 97 MMOL/L (ref 98–107)
CHOLEST SERPL-MCNC: 145 MG/DL (ref 0–200)
CO2 SERPL-SCNC: 24.4 MMOL/L (ref 22–29)
CREAT SERPL-MCNC: 1.15 MG/DL (ref 0.76–1.27)
DEPRECATED RDW RBC AUTO: 49.5 FL (ref 37–54)
EGFRCR SERPLBLD CKD-EPI 2021: 73.8 ML/MIN/1.73
EOSINOPHIL # BLD AUTO: 0.45 10*3/MM3 (ref 0–0.4)
EOSINOPHIL NFR BLD AUTO: 5.1 % (ref 0.3–6.2)
ERYTHROCYTE [DISTWIDTH] IN BLOOD BY AUTOMATED COUNT: 15.2 % (ref 12.3–15.4)
GLOBULIN UR ELPH-MCNC: 2.8 GM/DL
GLUCOSE SERPL-MCNC: 112 MG/DL (ref 65–99)
HCT VFR BLD AUTO: 34.3 % (ref 37.5–51)
HDLC SERPL-MCNC: 33 MG/DL (ref 40–60)
HGB BLD-MCNC: 11.8 G/DL (ref 13–17.7)
IMM GRANULOCYTES # BLD AUTO: 0.02 10*3/MM3 (ref 0–0.05)
IMM GRANULOCYTES NFR BLD AUTO: 0.2 % (ref 0–0.5)
LDLC SERPL CALC-MCNC: 78 MG/DL (ref 0–100)
LDLC/HDLC SERPL: 2.16 {RATIO}
LYMPHOCYTES # BLD AUTO: 4.07 10*3/MM3 (ref 0.7–3.1)
LYMPHOCYTES NFR BLD AUTO: 46.1 % (ref 19.6–45.3)
MCH RBC QN AUTO: 30.5 PG (ref 26.6–33)
MCHC RBC AUTO-ENTMCNC: 34.4 G/DL (ref 31.5–35.7)
MCV RBC AUTO: 88.6 FL (ref 79–97)
MONOCYTES # BLD AUTO: 0.68 10*3/MM3 (ref 0.1–0.9)
MONOCYTES NFR BLD AUTO: 7.7 % (ref 5–12)
NEUTROPHILS NFR BLD AUTO: 3.56 10*3/MM3 (ref 1.7–7)
NEUTROPHILS NFR BLD AUTO: 40.4 % (ref 42.7–76)
NRBC BLD AUTO-RTO: 0 /100 WBC (ref 0–0.2)
PLATELET # BLD AUTO: 339 10*3/MM3 (ref 140–450)
PMV BLD AUTO: 10.1 FL (ref 6–12)
POTASSIUM SERPL-SCNC: 3.6 MMOL/L (ref 3.5–5.2)
PROT SERPL-MCNC: 7.3 G/DL (ref 6–8.5)
RBC # BLD AUTO: 3.87 10*6/MM3 (ref 4.14–5.8)
SODIUM SERPL-SCNC: 136 MMOL/L (ref 136–145)
T4 FREE SERPL-MCNC: 1.06 NG/DL (ref 0.93–1.7)
TRIGL SERPL-MCNC: 203 MG/DL (ref 0–150)
TSH SERPL DL<=0.05 MIU/L-ACNC: 0.57 UIU/ML (ref 0.27–4.2)
VLDLC SERPL-MCNC: 34 MG/DL (ref 5–40)
WBC NRBC COR # BLD AUTO: 8.82 10*3/MM3 (ref 3.4–10.8)

## 2024-03-06 PROCEDURE — 36415 COLL VENOUS BLD VENIPUNCTURE: CPT | Performed by: NURSE PRACTITIONER

## 2024-03-06 PROCEDURE — 82728 ASSAY OF FERRITIN: CPT | Performed by: NURSE PRACTITIONER

## 2024-03-06 PROCEDURE — 84466 ASSAY OF TRANSFERRIN: CPT | Performed by: NURSE PRACTITIONER

## 2024-03-06 PROCEDURE — 80061 LIPID PANEL: CPT | Performed by: NURSE PRACTITIONER

## 2024-03-06 PROCEDURE — 84439 ASSAY OF FREE THYROXINE: CPT | Performed by: NURSE PRACTITIONER

## 2024-03-06 PROCEDURE — 80050 GENERAL HEALTH PANEL: CPT | Performed by: NURSE PRACTITIONER

## 2024-03-06 PROCEDURE — 83540 ASSAY OF IRON: CPT | Performed by: NURSE PRACTITIONER

## 2024-03-06 PROCEDURE — 83036 HEMOGLOBIN GLYCOSYLATED A1C: CPT | Performed by: NURSE PRACTITIONER

## 2024-03-06 RX ORDER — ESCITALOPRAM OXALATE 20 MG/1
20 TABLET ORAL DAILY
Qty: 90 TABLET | Refills: 0 | Status: SHIPPED | OUTPATIENT
Start: 2024-03-06

## 2024-03-06 RX ORDER — LISINOPRIL 20 MG/1
20 TABLET ORAL 2 TIMES DAILY
Qty: 180 TABLET | Refills: 0 | Status: SHIPPED | OUTPATIENT
Start: 2024-03-06

## 2024-03-06 RX ORDER — LEVOTHYROXINE SODIUM 112 UG/1
112 TABLET ORAL DAILY
Qty: 90 TABLET | Refills: 0 | Status: SHIPPED | OUTPATIENT
Start: 2024-03-06

## 2024-03-06 RX ORDER — HYDROCHLOROTHIAZIDE 12.5 MG/1
12.5 TABLET ORAL DAILY
Qty: 90 TABLET | Refills: 0 | Status: SHIPPED | OUTPATIENT
Start: 2024-03-06

## 2024-03-06 RX ORDER — ATORVASTATIN CALCIUM 40 MG/1
40 TABLET, FILM COATED ORAL DAILY
Qty: 90 TABLET | Refills: 0 | Status: SHIPPED | OUTPATIENT
Start: 2024-03-06

## 2024-03-06 NOTE — PROGRESS NOTES
Venipuncture Blood Specimen Collection  Venipuncture performed in left arm  by Nena Crystal with good hemostasis. Patient tolerated the procedure well without complications.   03/06/24   Nena Crystal

## 2024-03-07 DIAGNOSIS — R73.01 ELEVATED FASTING BLOOD SUGAR: ICD-10-CM

## 2024-03-07 DIAGNOSIS — D64.9 ANEMIA, UNSPECIFIED TYPE: ICD-10-CM

## 2024-03-07 LAB
FERRITIN SERPL-MCNC: 270 NG/ML (ref 30–400)
HBA1C MFR BLD: 6 % (ref 4.8–5.6)
IRON 24H UR-MRATE: 70 MCG/DL (ref 59–158)
IRON SATN MFR SERPL: 19 % (ref 20–50)
TIBC SERPL-MCNC: 365 MCG/DL (ref 298–536)
TRANSFERRIN SERPL-MCNC: 245 MG/DL (ref 200–360)

## 2024-06-18 DIAGNOSIS — E78.5 DYSLIPIDEMIA, GOAL LDL BELOW 70: ICD-10-CM

## 2024-06-18 DIAGNOSIS — Z91.89 ATHEROSCLEROTIC CARDIOVASCULAR DISEASE 10 YEAR RISK GREATER THAN 30% USING 2013 ACC/AHA CALCULATOR: ICD-10-CM

## 2024-06-18 DIAGNOSIS — I10 ESSENTIAL HYPERTENSION: ICD-10-CM

## 2024-06-18 DIAGNOSIS — F41.9 ANXIETY: ICD-10-CM

## 2024-06-18 NOTE — TELEPHONE ENCOUNTER
Caller: Simon Anthony FRANKLIN    Relationship: Self    Best call back number: [phone]     Requested Prescriptions:   Requested Prescriptions     Pending Prescriptions Disp Refills    lisinopril (PRINIVIL,ZESTRIL) 20 MG tablet 180 tablet 0     Sig: Take 1 tablet by mouth 2 (Two) Times a Day.    atorvastatin (LIPITOR) 40 MG tablet 90 tablet 0     Sig: Take 1 tablet by mouth Daily.    escitalopram (LEXAPRO) 20 MG tablet 90 tablet 0     Sig: Take 1 tablet by mouth Daily.    hydroCHLOROthiazide 12.5 MG tablet 90 tablet 0     Sig: Take 1 tablet by mouth Daily.        Pharmacy where request should be sent: [address] [phone] [facility] [phone] FX     Last office visit with prescribing clinician: 12/8/2023   Last telemedicine visit with prescribing clinician: Visit date not found   Next office visit with prescribing clinician: Visit date not found     Additional details provided by patient: LESS THAN THREE DAY SUPPLY LEFT ON HAND.     Does the patient have less than a 3 day supply:  [x] Yes  [] No    Would you like a call back once the refill request has been completed: [x] Yes [] No    If the office needs to give you a call back, can they leave a voicemail: [x] Yes [] No    Ivan Foster   06/18/24 17:15 EDT

## 2024-06-19 RX ORDER — ESCITALOPRAM OXALATE 20 MG/1
20 TABLET ORAL DAILY
Qty: 90 TABLET | Refills: 0 | Status: SHIPPED | OUTPATIENT
Start: 2024-06-19

## 2024-06-19 RX ORDER — ATORVASTATIN CALCIUM 40 MG/1
40 TABLET, FILM COATED ORAL DAILY
Qty: 90 TABLET | Refills: 0 | Status: SHIPPED | OUTPATIENT
Start: 2024-06-19

## 2024-06-19 RX ORDER — LISINOPRIL 20 MG/1
20 TABLET ORAL 2 TIMES DAILY
Qty: 180 TABLET | Refills: 0 | Status: SHIPPED | OUTPATIENT
Start: 2024-06-19

## 2024-06-19 RX ORDER — HYDROCHLOROTHIAZIDE 12.5 MG/1
12.5 TABLET ORAL DAILY
Qty: 90 TABLET | Refills: 0 | Status: SHIPPED | OUTPATIENT
Start: 2024-06-19

## 2024-06-20 DIAGNOSIS — E03.9 HYPOTHYROIDISM, UNSPECIFIED TYPE: ICD-10-CM

## 2024-06-21 RX ORDER — LEVOTHYROXINE SODIUM 112 UG/1
112 TABLET ORAL DAILY
Qty: 90 TABLET | Refills: 0 | Status: SHIPPED | OUTPATIENT
Start: 2024-06-21

## 2024-10-02 ENCOUNTER — OFFICE VISIT (OUTPATIENT)
Dept: FAMILY MEDICINE CLINIC | Facility: CLINIC | Age: 59
End: 2024-10-02
Payer: COMMERCIAL

## 2024-10-02 VITALS
WEIGHT: 198 LBS | TEMPERATURE: 98.4 F | BODY MASS INDEX: 29.33 KG/M2 | DIASTOLIC BLOOD PRESSURE: 90 MMHG | SYSTOLIC BLOOD PRESSURE: 170 MMHG | OXYGEN SATURATION: 99 % | HEART RATE: 78 BPM | HEIGHT: 69 IN

## 2024-10-02 DIAGNOSIS — Z12.5 PROSTATE CANCER SCREENING: ICD-10-CM

## 2024-10-02 DIAGNOSIS — Z91.89 ATHEROSCLEROTIC CARDIOVASCULAR DISEASE 10 YEAR RISK GREATER THAN 30% USING 2013 ACC/AHA CALCULATOR: ICD-10-CM

## 2024-10-02 DIAGNOSIS — D50.9 IRON DEFICIENCY ANEMIA, UNSPECIFIED IRON DEFICIENCY ANEMIA TYPE: ICD-10-CM

## 2024-10-02 DIAGNOSIS — F17.218 CIGARETTE NICOTINE DEPENDENCE WITH OTHER NICOTINE-INDUCED DISORDER: ICD-10-CM

## 2024-10-02 DIAGNOSIS — R73.03 PREDIABETES: ICD-10-CM

## 2024-10-02 DIAGNOSIS — Z12.2 SCREENING FOR LUNG CANCER: ICD-10-CM

## 2024-10-02 DIAGNOSIS — Z23 NEED FOR INFLUENZA VACCINATION: ICD-10-CM

## 2024-10-02 DIAGNOSIS — E78.5 DYSLIPIDEMIA, GOAL LDL BELOW 70: ICD-10-CM

## 2024-10-02 DIAGNOSIS — I10 ESSENTIAL HYPERTENSION: Primary | ICD-10-CM

## 2024-10-02 DIAGNOSIS — E61.1 LOW SERUM IRON: ICD-10-CM

## 2024-10-02 DIAGNOSIS — E03.9 HYPOTHYROIDISM, UNSPECIFIED TYPE: ICD-10-CM

## 2024-10-02 DIAGNOSIS — F41.9 ANXIETY: ICD-10-CM

## 2024-10-02 PROCEDURE — 90471 IMMUNIZATION ADMIN: CPT | Performed by: NURSE PRACTITIONER

## 2024-10-02 PROCEDURE — 99214 OFFICE O/P EST MOD 30 MIN: CPT | Performed by: NURSE PRACTITIONER

## 2024-10-02 PROCEDURE — 90656 IIV3 VACC NO PRSV 0.5 ML IM: CPT | Performed by: NURSE PRACTITIONER

## 2024-10-02 RX ORDER — LEVOTHYROXINE SODIUM 112 UG/1
112 TABLET ORAL DAILY
Qty: 30 TABLET | Refills: 5 | Status: SHIPPED | OUTPATIENT
Start: 2024-10-02

## 2024-10-02 RX ORDER — LISINOPRIL 20 MG/1
20 TABLET ORAL 2 TIMES DAILY
Qty: 60 TABLET | Refills: 5 | Status: SHIPPED | OUTPATIENT
Start: 2024-10-02

## 2024-10-02 RX ORDER — ATORVASTATIN CALCIUM 40 MG/1
40 TABLET, FILM COATED ORAL DAILY
Qty: 30 TABLET | Refills: 5 | Status: SHIPPED | OUTPATIENT
Start: 2024-10-02

## 2024-10-02 RX ORDER — ESCITALOPRAM OXALATE 20 MG/1
20 TABLET ORAL DAILY
Qty: 30 TABLET | Refills: 5 | Status: SHIPPED | OUTPATIENT
Start: 2024-10-02

## 2024-10-02 RX ORDER — HYDROCHLOROTHIAZIDE 12.5 MG/1
12.5 TABLET ORAL DAILY
Qty: 30 TABLET | Refills: 5 | Status: SHIPPED | OUTPATIENT
Start: 2024-10-02

## 2024-10-02 NOTE — PROGRESS NOTES
Chief Complaint  Hypothyroidism (refills), Hypertension, Hyperlipidemia, and Anxiety    History of Present Illness  Simon Anthony is a 59 y.o. male who presents to [facility] with a past medical history of  Past Medical History:   Diagnosis Date    Anemia     Anxiety     Eczema     Hypertension     Hypothyroidism      Simon presents to the office today for his 6-month follow-up appointment.  His last office visit was in March.  He has ran out of his medication as of the past 2 weeks with the exception of one of his blood pressure pills.  He did not call to get a refill of these as that was not something he knew that he could do.    He is currently prescribed Lexapro 20 mg daily for treatment of anxiety.  He can definitely tell that he has been out of his medication.  He is more anxious and irritable and losing patience at work.  He typically does react this way when he is not taking the medication.  When he takes the medication he does a lot better.  PHQ-9 is elevated, likely secondary to being out of the Lexapro.  No homicidal ideations or suicidal ideations.  He denies any AVH.  He does states that sleep is typically disrupted by voiding.  He has to get up every 2-3 hours to urinate.    PHQ-9 Depression Screening  Little interest or pleasure in doing things? 1-->several days   Feeling down, depressed, or hopeless? 1-->several days   Trouble falling or staying asleep, or sleeping too much? 1-->several days (sleeping too much)   Feeling tired or having little energy? 3-->nearly every day   Poor appetite or overeating? 0-->not at all   Feeling bad about yourself - or that you are a failure or have let yourself or your family down? 3-->nearly every day   Trouble concentrating on things, such as reading the newspaper or watching television? 3-->nearly every day (trouble focusing)   Moving or speaking so slowly that other people could have noticed? Or the opposite - being so fidgety  "or restless that you have been moving around a lot more than usual? 1-->several days (restless at times)   Thoughts that you would be better off dead, or of hurting yourself in some way? 0-->not at all   PHQ-9 Total Score 13   If you checked off any problems, how difficult have these problems made it for you to do your work, take care of things at home, or get along with other people? very difficult     He is prescribed atorvastatin for dyslipidemia.  No complaints of myalgia with use.    Blood pressure is elevated on exam today, 170/90.  Again, he has been out of his blood pressure medication.  He was able to take a lisinopril this morning, but has not had HCTZ.  Denies chest pain, palpitations, headaches, dizziness, shortness of breath.  No lower extremity edema.    He is prescribed levothyroxine for hypothyroidism.  No change in hair, skin, or nails.  He has a history of eczema and has been using Aquaphor over-the-counter for treatment.  Reports this is working well.    He is overdue for CT of the chest for lung cancer screening.  Currently no symptoms of lung cancer.  Denies any unexpected weight loss, cough, wheeze, or shortness of breath.  No abnormal sputum production.  He does continue to smoke.  He denies any intent to quit smoking at this time.  He has a 66 pack years history.    He has a history of prediabetes on labs.  Previous A1c 6%.  Currently denies polydipsia or polyphagia.  Endorses polyuria with nocturia.    History of low serum iron.  Colonoscopy unrevealing in 2023.  Denies dysphagia, nausea, vomiting, abdominal pain, heartburn, or acid reflux.    Objective   Vital Signs:   Vitals:    10/02/24 1311   BP: 170/90   BP Location: Right arm   Pulse: 78   Temp: 98.4 °F (36.9 °C)   TempSrc: Temporal   SpO2: 99%   Weight: 89.8 kg (198 lb)   Height: 175.3 cm (69\")     Body mass index is 29.24 kg/m².    Wt Readings from Last 3 Encounters:   10/02/24 89.8 kg (198 lb)   12/08/23 87.1 kg (192 lb)   05/26/23 " 88.9 kg (196 lb)     BP Readings from Last 3 Encounters:   10/02/24 170/90   12/08/23 140/80   05/26/23 162/82       Health Maintenance   Topic Date Due    LUNG CANCER SCREENING  01/22/2024    INFLUENZA VACCINE  08/01/2024    ANNUAL PHYSICAL  12/08/2024    LIPID PANEL  03/06/2025    BMI FOLLOWUP  10/02/2025    TDAP/TD VACCINES (2 - Td or Tdap) 12/01/2026    COLORECTAL CANCER SCREENING  05/10/2028    HEPATITIS C SCREENING  Completed    Pneumococcal Vaccine 0-64  Completed    ZOSTER VACCINE  Completed    COVID-19 Vaccine  Discontinued       Physical Exam  Vitals reviewed.   Constitutional:       General: He is not in acute distress.     Appearance: He is well-developed and overweight. He is not ill-appearing.   HENT:      Head: Normocephalic and atraumatic.   Eyes:      General: No scleral icterus.        Right eye: No discharge.         Left eye: No discharge.      Extraocular Movements: Extraocular movements intact.      Conjunctiva/sclera: Conjunctivae normal.   Neck:      Thyroid: No thyroid mass, thyromegaly or thyroid tenderness.      Vascular: No carotid bruit.      Trachea: Trachea normal.   Cardiovascular:      Rate and Rhythm: Normal rate and regular rhythm.      Pulses: Normal pulses.      Heart sounds: No murmur heard.  Pulmonary:      Effort: Pulmonary effort is normal.      Breath sounds: Normal breath sounds. No wheezing, rhonchi or rales.   Musculoskeletal:         General: Normal range of motion.      Cervical back: Normal range of motion and neck supple. No tenderness.      Right lower leg: No edema.      Left lower leg: No edema.   Lymphadenopathy:      Cervical: No cervical adenopathy.   Skin:     General: Skin is warm and dry.   Neurological:      Mental Status: He is alert and oriented to person, place, and time.   Psychiatric:         Mood and Affect: Mood and affect normal.         Behavior: Behavior normal.         Thought Content: Thought content normal.         Judgment: Judgment normal.          Result Review :  The following data was reviewed by: VIJAY Roldan on 10/02/2024:    No visits with results within 1 Month(s) from this visit.   Latest known visit with results is:   Office Visit on 12/08/2023   Component Date Value    WBC 03/06/2024 8.82     RBC 03/06/2024 3.87 (L)     Hemoglobin 03/06/2024 11.8 (L)     Hematocrit 03/06/2024 34.3 (L)     MCV 03/06/2024 88.6     MCH 03/06/2024 30.5     MCHC 03/06/2024 34.4     RDW 03/06/2024 15.2     RDW-SD 03/06/2024 49.5     MPV 03/06/2024 10.1     Platelets 03/06/2024 339     Neutrophil % 03/06/2024 40.4 (L)     Lymphocyte % 03/06/2024 46.1 (H)     Monocyte % 03/06/2024 7.7     Eosinophil % 03/06/2024 5.1     Basophil % 03/06/2024 0.5     Immature Grans % 03/06/2024 0.2     Neutrophils, Absolute 03/06/2024 3.56     Lymphocytes, Absolute 03/06/2024 4.07 (H)     Monocytes, Absolute 03/06/2024 0.68     Eosinophils, Absolute 03/06/2024 0.45 (H)     Basophils, Absolute 03/06/2024 0.04     Immature Grans, Absolute 03/06/2024 0.02     nRBC 03/06/2024 0.0     Glucose 03/06/2024 112 (H)     BUN 03/06/2024 12     Creatinine 03/06/2024 1.15     Sodium 03/06/2024 136     Potassium 03/06/2024 3.6     Chloride 03/06/2024 97 (L)     CO2 03/06/2024 24.4     Calcium 03/06/2024 9.2     Total Protein 03/06/2024 7.3     Albumin 03/06/2024 4.5     ALT (SGPT) 03/06/2024 31     AST (SGOT) 03/06/2024 21     Alkaline Phosphatase 03/06/2024 91     Total Bilirubin 03/06/2024 <0.2     Globulin 03/06/2024 2.8     A/G Ratio 03/06/2024 1.6     BUN/Creatinine Ratio 03/06/2024 10.4     Anion Gap 03/06/2024 14.6     eGFR 03/06/2024 73.8     Total Cholesterol 03/06/2024 145     Triglycerides 03/06/2024 203 (H)     HDL Cholesterol 03/06/2024 33 (L)     LDL Cholesterol  03/06/2024 78     VLDL Cholesterol 03/06/2024 34     LDL/HDL Ratio 03/06/2024 2.16     TSH 03/06/2024 0.571     Free T4 03/06/2024 1.06     Microalbumin/Creatinine * 12/08/2023      Creatinine, Urine 12/08/2023  31.0     Microalbumin, Urine 12/08/2023 <1.2     Color, UA 12/08/2023 Yellow     Appearance, UA 12/08/2023 Clear     pH, UA 12/08/2023 7.0     Specific Gravity, UA 12/08/2023 1.007     Glucose, UA 12/08/2023 Negative     Ketones, UA 12/08/2023 Negative     Bilirubin, UA 12/08/2023 Negative     Blood, UA 12/08/2023 Negative     Protein, UA 12/08/2023 Negative     Leuk Esterase, UA 12/08/2023 Negative     Nitrite, UA 12/08/2023 Negative     Urobilinogen, UA 12/08/2023 0.2 E.U./dL     Hemoglobin A1C 03/06/2024 6.00 (H)     Iron 03/06/2024 70     Iron Saturation (TSAT) 03/06/2024 19 (L)     Transferrin 03/06/2024 245     TIBC 03/06/2024 365     Ferritin 03/06/2024 270.00      CT Chest With Contrast Diagnostic (01/22/2023 15:42)     COLONOSCOPY (05/10/2023 09:19)     Procedures        Assessment and Plan   Diagnoses and all orders for this visit:    1. Essential hypertension (Primary)  -     hydroCHLOROthiazide 12.5 MG tablet; Take 1 tablet by mouth Daily.  Dispense: 30 tablet; Refill: 5  -     lisinopril (PRINIVIL,ZESTRIL) 20 MG tablet; Take 1 tablet by mouth 2 (Two) Times a Day.  Dispense: 60 tablet; Refill: 5  -     CBC Auto Differential; Future  -     Comprehensive Metabolic Panel; Future  -     Lipid Panel; Future  -     TSH+Free T4; Future  -     Microalbumin / Creatinine Urine Ratio - Urine, Clean Catch; Future    2. Dyslipidemia, goal LDL below 70  -     atorvastatin (LIPITOR) 40 MG tablet; Take 1 tablet by mouth Daily.  Dispense: 30 tablet; Refill: 5  -     Comprehensive Metabolic Panel; Future  -     Lipid Panel; Future    3. Atherosclerotic cardiovascular disease 10 year risk greater than 30% using 2013 ACC/AHA calculator  -     atorvastatin (LIPITOR) 40 MG tablet; Take 1 tablet by mouth Daily.  Dispense: 30 tablet; Refill: 5    4. Prediabetes  -     Hemoglobin A1c; Future    5. Hypothyroidism, unspecified type  -     levothyroxine (SYNTHROID, LEVOTHROID) 112 MCG tablet; Take 1 tablet by mouth Daily.   Dispense: 30 tablet; Refill: 5  -     TSH+Free T4; Future    6. Anxiety  -     escitalopram (LEXAPRO) 20 MG tablet; Take 1 tablet by mouth Daily.  Dispense: 30 tablet; Refill: 5    7. Iron deficiency anemia, unspecified iron deficiency anemia type  -     Iron Profile; Future  -     Ferritin; Future    8. Low serum iron  -     Iron Profile; Future  -     Ferritin; Future    9. Screening for lung cancer  -      CT Chest Low Dose Cancer Screening WO; Future    10. Cigarette nicotine dependence with other nicotine-induced disorder  -      CT Chest Low Dose Cancer Screening WO; Future    11. Need for influenza vaccination  -     Fluzone >6mos ([id number])    12. Prostate cancer screening  -     PSA Screen; Future               FOLLOW UP  Return in about 6 months (around 4/2/2025) for Next scheduled follow up.    Blood pressure is elevated on exam today; however, he has been out of his blood pressure medication.  He did take a lisinopril this morning.  Has not been taking HCTZ.  He will monitor blood pressure at work, works at [employer], and will call if blood pressure is greater than 135/85.    He will return to the office next week for fasting labs.  Goal LDL less than 70.  Adjust atorvastatin if indicated.    We will check A1c to surveillance for prediabetes.  Last A1c 6%.    Continue levothyroxine for now.  He is taking 112 mcg daily.  TSH might be abnormal due to being out of levothyroxine for the past 2 weeks.  Consider close follow-up if abnormal.    I will check iron profile with ferritin due to history of low serum iron/iron deficiency in the past.  Colonoscopy unrevealing.  He currently has no upper GI symptoms.    CT of the chest for lung cancer screening.  PSA for prostate cancer screening.  Flu shot administered today.    Patient was given instructions and counseling regarding his condition or for health maintenance advice. Please see specific information pulled into the AVS if appropriate.       Soheila PARR  Vessels, APRN  10/02/24  14:05 EDT    CURRENT & DISCONTINUED MEDICATIONS  Current Outpatient Medications   Medication Instructions    atorvastatin (LIPITOR) 40 mg, Oral, Daily    Emollient (Aquaphor Advanced Therapy) ointment ointment 1 application , Topical, 2 Times Daily PRN    escitalopram (LEXAPRO) 20 mg, Oral, Daily    hydroCHLOROthiazide 12.5 mg, Oral, Daily    levothyroxine (SYNTHROID, LEVOTHROID) 112 mcg, Oral, Daily    lisinopril (PRINIVIL,ZESTRIL) 20 mg, Oral, 2 Times Daily       Medications Discontinued During This Encounter   Medication Reason    azithromycin (Zithromax Z-Gustabo) 250 MG tablet *Therapy completed    methylPREDNISolone (MEDROL) 4 MG dose pack *Therapy completed    lisinopril (PRINIVIL,ZESTRIL) 20 MG tablet Reorder    atorvastatin (LIPITOR) 40 MG tablet Reorder    escitalopram (LEXAPRO) 20 MG tablet Reorder    hydroCHLOROthiazide 12.5 MG tablet Reorder    levothyroxine (SYNTHROID, LEVOTHROID) 112 MCG tablet Reorder

## 2024-10-07 ENCOUNTER — CLINICAL SUPPORT (OUTPATIENT)
Dept: FAMILY MEDICINE CLINIC | Facility: CLINIC | Age: 59
End: 2024-10-07
Payer: COMMERCIAL

## 2024-10-07 DIAGNOSIS — E03.9 HYPOTHYROIDISM, UNSPECIFIED TYPE: ICD-10-CM

## 2024-10-07 DIAGNOSIS — Z12.5 PROSTATE CANCER SCREENING: ICD-10-CM

## 2024-10-07 DIAGNOSIS — R73.03 PREDIABETES: ICD-10-CM

## 2024-10-07 DIAGNOSIS — E78.5 DYSLIPIDEMIA, GOAL LDL BELOW 70: ICD-10-CM

## 2024-10-07 DIAGNOSIS — E61.1 LOW SERUM IRON: ICD-10-CM

## 2024-10-07 DIAGNOSIS — D50.9 IRON DEFICIENCY ANEMIA, UNSPECIFIED IRON DEFICIENCY ANEMIA TYPE: ICD-10-CM

## 2024-10-07 DIAGNOSIS — I10 ESSENTIAL HYPERTENSION: ICD-10-CM

## 2024-10-07 LAB
ALBUMIN SERPL-MCNC: 4.6 G/DL (ref 3.5–5.2)
ALBUMIN UR-MCNC: 2.4 MG/DL
ALBUMIN/GLOB SERPL: 1.5 G/DL
ALP SERPL-CCNC: 92 U/L (ref 39–117)
ALT SERPL W P-5'-P-CCNC: 26 U/L (ref 1–41)
ANION GAP SERPL CALCULATED.3IONS-SCNC: 10.6 MMOL/L (ref 5–15)
AST SERPL-CCNC: 25 U/L (ref 1–40)
BASOPHILS # BLD AUTO: 0.03 10*3/MM3 (ref 0–0.2)
BASOPHILS NFR BLD AUTO: 0.4 % (ref 0–1.5)
BILIRUB SERPL-MCNC: 0.3 MG/DL (ref 0–1.2)
BUN SERPL-MCNC: 11 MG/DL (ref 6–20)
BUN/CREAT SERPL: 8.5 (ref 7–25)
CALCIUM SPEC-SCNC: 10.1 MG/DL (ref 8.6–10.5)
CHLORIDE SERPL-SCNC: 99 MMOL/L (ref 98–107)
CHOLEST SERPL-MCNC: 178 MG/DL (ref 0–200)
CO2 SERPL-SCNC: 28.4 MMOL/L (ref 22–29)
CREAT SERPL-MCNC: 1.3 MG/DL (ref 0.76–1.27)
CREAT UR-MCNC: 212.1 MG/DL
DEPRECATED RDW RBC AUTO: 45.3 FL (ref 37–54)
EGFRCR SERPLBLD CKD-EPI 2021: 63.3 ML/MIN/1.73
EOSINOPHIL # BLD AUTO: 0.37 10*3/MM3 (ref 0–0.4)
EOSINOPHIL NFR BLD AUTO: 4.9 % (ref 0.3–6.2)
ERYTHROCYTE [DISTWIDTH] IN BLOOD BY AUTOMATED COUNT: 13.4 % (ref 12.3–15.4)
FERRITIN SERPL-MCNC: 281 NG/ML (ref 30–400)
GLOBULIN UR ELPH-MCNC: 3.1 GM/DL
GLUCOSE SERPL-MCNC: 88 MG/DL (ref 65–99)
HBA1C MFR BLD: 6.1 % (ref 4.8–5.6)
HCT VFR BLD AUTO: 37.5 % (ref 37.5–51)
HDLC SERPL-MCNC: 36 MG/DL (ref 40–60)
HGB BLD-MCNC: 12.9 G/DL (ref 13–17.7)
IMM GRANULOCYTES # BLD AUTO: 0.02 10*3/MM3 (ref 0–0.05)
IMM GRANULOCYTES NFR BLD AUTO: 0.3 % (ref 0–0.5)
IRON 24H UR-MRATE: 70 MCG/DL (ref 59–158)
IRON SATN MFR SERPL: 19 % (ref 20–50)
LDLC SERPL CALC-MCNC: 109 MG/DL (ref 0–100)
LDLC/HDLC SERPL: 2.89 {RATIO}
LYMPHOCYTES # BLD AUTO: 2.99 10*3/MM3 (ref 0.7–3.1)
LYMPHOCYTES NFR BLD AUTO: 39.4 % (ref 19.6–45.3)
MCH RBC QN AUTO: 31.5 PG (ref 26.6–33)
MCHC RBC AUTO-ENTMCNC: 34.4 G/DL (ref 31.5–35.7)
MCV RBC AUTO: 91.7 FL (ref 79–97)
MICROALBUMIN/CREAT UR: 11.3 MG/G (ref 0–29)
MONOCYTES # BLD AUTO: 0.63 10*3/MM3 (ref 0.1–0.9)
MONOCYTES NFR BLD AUTO: 8.3 % (ref 5–12)
NEUTROPHILS NFR BLD AUTO: 3.54 10*3/MM3 (ref 1.7–7)
NEUTROPHILS NFR BLD AUTO: 46.7 % (ref 42.7–76)
NRBC BLD AUTO-RTO: 0 /100 WBC (ref 0–0.2)
PLATELET # BLD AUTO: 355 10*3/MM3 (ref 140–450)
PMV BLD AUTO: 10.3 FL (ref 6–12)
POTASSIUM SERPL-SCNC: 4.5 MMOL/L (ref 3.5–5.2)
PROT SERPL-MCNC: 7.7 G/DL (ref 6–8.5)
PSA SERPL-MCNC: 1.1 NG/ML (ref 0–4)
RBC # BLD AUTO: 4.09 10*6/MM3 (ref 4.14–5.8)
SODIUM SERPL-SCNC: 138 MMOL/L (ref 136–145)
T4 FREE SERPL-MCNC: 0.69 NG/DL (ref 0.92–1.68)
TIBC SERPL-MCNC: 375 MCG/DL (ref 298–536)
TRANSFERRIN SERPL-MCNC: 252 MG/DL (ref 200–360)
TRIGL SERPL-MCNC: 190 MG/DL (ref 0–150)
TSH SERPL DL<=0.05 MIU/L-ACNC: 14.9 UIU/ML (ref 0.27–4.2)
VLDLC SERPL-MCNC: 33 MG/DL (ref 5–40)
WBC NRBC COR # BLD AUTO: 7.58 10*3/MM3 (ref 3.4–10.8)

## 2024-10-07 PROCEDURE — 82570 ASSAY OF URINE CREATININE: CPT | Performed by: NURSE PRACTITIONER

## 2024-10-07 PROCEDURE — 82043 UR ALBUMIN QUANTITATIVE: CPT | Performed by: NURSE PRACTITIONER

## 2024-10-07 PROCEDURE — 80050 GENERAL HEALTH PANEL: CPT | Performed by: NURSE PRACTITIONER

## 2024-10-07 PROCEDURE — 36415 COLL VENOUS BLD VENIPUNCTURE: CPT | Performed by: NURSE PRACTITIONER

## 2024-10-07 PROCEDURE — G0103 PSA SCREENING: HCPCS | Performed by: NURSE PRACTITIONER

## 2024-10-07 PROCEDURE — 84466 ASSAY OF TRANSFERRIN: CPT | Performed by: NURSE PRACTITIONER

## 2024-10-07 PROCEDURE — 83036 HEMOGLOBIN GLYCOSYLATED A1C: CPT | Performed by: NURSE PRACTITIONER

## 2024-10-07 PROCEDURE — 83540 ASSAY OF IRON: CPT | Performed by: NURSE PRACTITIONER

## 2024-10-07 PROCEDURE — 84439 ASSAY OF FREE THYROXINE: CPT | Performed by: NURSE PRACTITIONER

## 2024-10-07 PROCEDURE — 82728 ASSAY OF FERRITIN: CPT | Performed by: NURSE PRACTITIONER

## 2024-10-07 PROCEDURE — 80061 LIPID PANEL: CPT | Performed by: NURSE PRACTITIONER

## 2024-10-07 NOTE — PROGRESS NOTES
..  Venipuncture Blood Specimen Collection  Venipuncture performed in Lt arm by Nena Crystal with good hemostasis. Patient tolerated the procedure well without complications.   10/07/24   Yumiko Marie MA

## 2024-10-08 DIAGNOSIS — R79.89 INCREASE IN SERUM CREATININE FROM PRIOR MEASUREMENT: ICD-10-CM

## 2024-10-08 DIAGNOSIS — E03.9 HYPOTHYROIDISM, UNSPECIFIED TYPE: Primary | ICD-10-CM

## 2025-03-31 ENCOUNTER — OFFICE VISIT (OUTPATIENT)
Dept: FAMILY MEDICINE CLINIC | Facility: CLINIC | Age: 60
End: 2025-03-31
Payer: COMMERCIAL

## 2025-03-31 VITALS
HEIGHT: 69 IN | TEMPERATURE: 98.2 F | DIASTOLIC BLOOD PRESSURE: 80 MMHG | WEIGHT: 203 LBS | OXYGEN SATURATION: 97 % | BODY MASS INDEX: 30.07 KG/M2 | HEART RATE: 86 BPM | SYSTOLIC BLOOD PRESSURE: 130 MMHG

## 2025-03-31 DIAGNOSIS — I10 ESSENTIAL HYPERTENSION: ICD-10-CM

## 2025-03-31 DIAGNOSIS — E78.5 DYSLIPIDEMIA, GOAL LDL BELOW 70: ICD-10-CM

## 2025-03-31 DIAGNOSIS — E03.9 HYPOTHYROIDISM, UNSPECIFIED TYPE: ICD-10-CM

## 2025-03-31 DIAGNOSIS — K52.9 POSTPRANDIAL DIARRHEA: ICD-10-CM

## 2025-03-31 DIAGNOSIS — F41.9 ANXIETY: ICD-10-CM

## 2025-03-31 DIAGNOSIS — R73.03 PRE-DIABETES: ICD-10-CM

## 2025-03-31 DIAGNOSIS — Z12.2 SCREENING FOR LUNG CANCER: ICD-10-CM

## 2025-03-31 DIAGNOSIS — Z00.00 ANNUAL PHYSICAL EXAM: Primary | ICD-10-CM

## 2025-03-31 DIAGNOSIS — F17.218 CIGARETTE NICOTINE DEPENDENCE WITH OTHER NICOTINE-INDUCED DISORDER: ICD-10-CM

## 2025-03-31 DIAGNOSIS — Z91.89 ATHEROSCLEROTIC CARDIOVASCULAR DISEASE 10 YEAR RISK GREATER THAN 30% USING 2013 ACC/AHA CALCULATOR: ICD-10-CM

## 2025-03-31 LAB
ALBUMIN SERPL-MCNC: 4.4 G/DL (ref 3.5–5.2)
ALBUMIN UR-MCNC: <1.2 MG/DL
ALBUMIN/GLOB SERPL: 1.4 G/DL
ALP SERPL-CCNC: 87 U/L (ref 39–117)
ALT SERPL W P-5'-P-CCNC: 46 U/L (ref 1–41)
ANION GAP SERPL CALCULATED.3IONS-SCNC: 9.6 MMOL/L (ref 5–15)
AST SERPL-CCNC: 30 U/L (ref 1–40)
BASOPHILS # BLD AUTO: 0.05 10*3/MM3 (ref 0–0.2)
BASOPHILS NFR BLD AUTO: 0.6 % (ref 0–1.5)
BILIRUB SERPL-MCNC: <0.2 MG/DL (ref 0–1.2)
BUN SERPL-MCNC: 15 MG/DL (ref 6–20)
BUN/CREAT SERPL: 12.5 (ref 7–25)
CALCIUM SPEC-SCNC: 9.9 MG/DL (ref 8.6–10.5)
CHLORIDE SERPL-SCNC: 98 MMOL/L (ref 98–107)
CHOLEST SERPL-MCNC: 177 MG/DL (ref 0–200)
CO2 SERPL-SCNC: 28.4 MMOL/L (ref 22–29)
CREAT SERPL-MCNC: 1.2 MG/DL (ref 0.76–1.27)
CREAT UR-MCNC: 92.5 MG/DL
DEPRECATED RDW RBC AUTO: 45.9 FL (ref 37–54)
EGFRCR SERPLBLD CKD-EPI 2021: 69.7 ML/MIN/1.73
EOSINOPHIL # BLD AUTO: 0.3 10*3/MM3 (ref 0–0.4)
EOSINOPHIL NFR BLD AUTO: 3.4 % (ref 0.3–6.2)
ERYTHROCYTE [DISTWIDTH] IN BLOOD BY AUTOMATED COUNT: 13.6 % (ref 12.3–15.4)
GLOBULIN UR ELPH-MCNC: 3.1 GM/DL
GLUCOSE SERPL-MCNC: 90 MG/DL (ref 65–99)
HBA1C MFR BLD: 6.1 % (ref 4.8–5.6)
HCT VFR BLD AUTO: 38.6 % (ref 37.5–51)
HDLC SERPL-MCNC: 34 MG/DL (ref 40–60)
HGB BLD-MCNC: 13.2 G/DL (ref 13–17.7)
IMM GRANULOCYTES # BLD AUTO: 0.02 10*3/MM3 (ref 0–0.05)
IMM GRANULOCYTES NFR BLD AUTO: 0.2 % (ref 0–0.5)
LDLC SERPL CALC-MCNC: 95 MG/DL (ref 0–100)
LDLC/HDLC SERPL: 2.54 {RATIO}
LYMPHOCYTES # BLD AUTO: 3.38 10*3/MM3 (ref 0.7–3.1)
LYMPHOCYTES NFR BLD AUTO: 38.5 % (ref 19.6–45.3)
MCH RBC QN AUTO: 31.3 PG (ref 26.6–33)
MCHC RBC AUTO-ENTMCNC: 34.2 G/DL (ref 31.5–35.7)
MCV RBC AUTO: 91.5 FL (ref 79–97)
MICROALBUMIN/CREAT UR: NORMAL MG/G{CREAT}
MONOCYTES # BLD AUTO: 0.83 10*3/MM3 (ref 0.1–0.9)
MONOCYTES NFR BLD AUTO: 9.5 % (ref 5–12)
NEUTROPHILS NFR BLD AUTO: 4.2 10*3/MM3 (ref 1.7–7)
NEUTROPHILS NFR BLD AUTO: 47.8 % (ref 42.7–76)
NRBC BLD AUTO-RTO: 0 /100 WBC (ref 0–0.2)
PLATELET # BLD AUTO: 317 10*3/MM3 (ref 140–450)
PMV BLD AUTO: 10.5 FL (ref 6–12)
POTASSIUM SERPL-SCNC: 4.2 MMOL/L (ref 3.5–5.2)
PROT SERPL-MCNC: 7.5 G/DL (ref 6–8.5)
RBC # BLD AUTO: 4.22 10*6/MM3 (ref 4.14–5.8)
SODIUM SERPL-SCNC: 136 MMOL/L (ref 136–145)
T4 FREE SERPL-MCNC: 0.99 NG/DL (ref 0.92–1.68)
TRIGL SERPL-MCNC: 283 MG/DL (ref 0–150)
TSH SERPL DL<=0.05 MIU/L-ACNC: 0.63 UIU/ML (ref 0.27–4.2)
VLDLC SERPL-MCNC: 48 MG/DL (ref 5–40)
WBC NRBC COR # BLD AUTO: 8.78 10*3/MM3 (ref 3.4–10.8)

## 2025-03-31 PROCEDURE — 80050 GENERAL HEALTH PANEL: CPT | Performed by: NURSE PRACTITIONER

## 2025-03-31 PROCEDURE — 82043 UR ALBUMIN QUANTITATIVE: CPT | Performed by: NURSE PRACTITIONER

## 2025-03-31 PROCEDURE — 84439 ASSAY OF FREE THYROXINE: CPT | Performed by: NURSE PRACTITIONER

## 2025-03-31 PROCEDURE — 82570 ASSAY OF URINE CREATININE: CPT | Performed by: NURSE PRACTITIONER

## 2025-03-31 PROCEDURE — 83036 HEMOGLOBIN GLYCOSYLATED A1C: CPT | Performed by: NURSE PRACTITIONER

## 2025-03-31 PROCEDURE — 80061 LIPID PANEL: CPT | Performed by: NURSE PRACTITIONER

## 2025-03-31 RX ORDER — LEVOTHYROXINE SODIUM 112 UG/1
112 TABLET ORAL DAILY
Qty: 30 TABLET | Refills: 5 | Status: SHIPPED | OUTPATIENT
Start: 2025-03-31

## 2025-03-31 RX ORDER — ESCITALOPRAM OXALATE 20 MG/1
20 TABLET ORAL DAILY
Qty: 30 TABLET | Refills: 5 | Status: SHIPPED | OUTPATIENT
Start: 2025-03-31

## 2025-03-31 RX ORDER — HYDROCHLOROTHIAZIDE 12.5 MG/1
12.5 TABLET ORAL DAILY
Qty: 30 TABLET | Refills: 5 | Status: SHIPPED | OUTPATIENT
Start: 2025-03-31

## 2025-03-31 RX ORDER — DICYCLOMINE HCL 20 MG
20 TABLET ORAL
Qty: 120 TABLET | Refills: 0 | Status: SHIPPED | OUTPATIENT
Start: 2025-03-31

## 2025-03-31 RX ORDER — ATORVASTATIN CALCIUM 40 MG/1
40 TABLET, FILM COATED ORAL DAILY
Qty: 30 TABLET | Refills: 5 | Status: SHIPPED | OUTPATIENT
Start: 2025-03-31

## 2025-03-31 RX ORDER — LISINOPRIL 20 MG/1
20 TABLET ORAL 2 TIMES DAILY
Qty: 60 TABLET | Refills: 5 | Status: SHIPPED | OUTPATIENT
Start: 2025-03-31

## 2025-03-31 NOTE — PROGRESS NOTES
Chief Complaint  Hypothyroidism (Follow up, med refills), Hyperlipidemia, Hypertension, Anxiety, and Annual Exam    History of Present Illness  Simon Anthony is a 59 y.o. male who presents to [facility] with a past medical history of    Past Medical History:   Diagnosis Date    Anemia     Anxiety     Eczema     Hypertension     Hypothyroidism        History of Present Illness  The patient is a 59-year-old male who presents to the office today to follow up on chronic health conditions and medication refills.    He has not undergone lung cancer screening since 2023. He continues to smoke, consuming 1.5 packs per day since 1980, with no current motivation to quit. He reports no symptoms indicative of lung cancer such as unexpected weight loss or a worsening cough.     He has gained a few pounds due to dietary habits, including frequent consumption of Faye's food.    His anxiety is well-managed with Lexapro.  He denies any homicidal ideations or suicidal ideations.  Denies any AVH.  Denies any adverse side effects.    He experiences loose stools, often having bowel movements immediately after eating, and has not had a solid bowel movement in some time. He has undergone a lower GI procedure but has not had an upper endoscopy. He occasionally experiences heartburn and reflux, but not consistently. He has not been tested for celiac disease or alpha-gal syndrome. He also reports no nausea, vomiting, or right upper quadrant pain. He does not experience diarrhea at night but does urinate. He typically eats twice a day and reports no difficulty swallowing.    He continues to take atorvastatin for cholesterol management.    He is on hydrochlorothiazide and lisinopril for blood pressure control and reports no issues with his blood pressure. He also reports no chest pain, palpitations, headaches, dizziness, or shortness of breath.    He is on thyroid medication but ran out  "yesterday.    SOCIAL HISTORY  He admits to smoking 1.5 packs per day since 1980. He works at [employer].    MEDICATIONS  Current: Lexapro, atorvastatin, hydrochlorothiazide, lisinopril        Objective   Vital Signs:   Vitals:    03/31/25 1125   BP: 130/80   BP Location: Left arm   Pulse: 86   Temp: 98.2 °F (36.8 °C)   TempSrc: Temporal   SpO2: 97%   Weight: 92.1 kg (203 lb)   Height: 175.3 cm (69\")     Body mass index is 29.98 kg/m².    Wt Readings from Last 3 Encounters:   03/31/25 92.1 kg (203 lb)   10/02/24 89.8 kg (198 lb)   12/08/23 87.1 kg (192 lb)     BP Readings from Last 3 Encounters:   03/31/25 130/80   10/02/24 170/90   12/08/23 140/80       Health Maintenance   Topic Date Due    LUNG CANCER SCREENING  01/22/2024    ANNUAL PHYSICAL  12/08/2024    LIPID PANEL  10/07/2025    TDAP/TD VACCINES (2 - Td or Tdap) 12/01/2026    COLORECTAL CANCER SCREENING  05/10/2028    HEPATITIS C SCREENING  Completed    INFLUENZA VACCINE  Completed    Pneumococcal Vaccine 50+  Completed    ZOSTER VACCINE  Completed    COVID-19 Vaccine  Discontinued       Physical Exam  Vitals reviewed.   Constitutional:       General: He is not in acute distress.     Appearance: He is well-developed and overweight. He is not ill-appearing.   HENT:      Head: Normocephalic and atraumatic.      Right Ear: Tympanic membrane, ear canal and external ear normal.      Left Ear: Tympanic membrane, ear canal and external ear normal.      Nose: Nose normal.      Mouth/Throat:      Mouth: Mucous membranes are moist.      Pharynx: Oropharynx is clear. No oropharyngeal exudate or posterior oropharyngeal erythema.   Eyes:      General: No scleral icterus.        Right eye: No discharge.         Left eye: No discharge.      Extraocular Movements: Extraocular movements intact.      Conjunctiva/sclera: Conjunctivae normal.      Pupils: Pupils are equal, round, and reactive to light.   Neck:      Thyroid: No thyromegaly.      Vascular: No carotid bruit.      " Trachea: Trachea normal.   Cardiovascular:      Rate and Rhythm: Normal rate and regular rhythm.      Pulses: Normal pulses.      Heart sounds: No murmur heard.  Pulmonary:      Effort: Pulmonary effort is normal.      Breath sounds: Normal breath sounds. No wheezing, rhonchi or rales.   Abdominal:      General: Bowel sounds are normal. There is no distension.      Palpations: Abdomen is soft. There is no mass.      Tenderness: There is no abdominal tenderness.   Musculoskeletal:         General: Normal range of motion.      Cervical back: Normal range of motion and neck supple. No tenderness.      Right lower leg: No edema.      Left lower leg: No edema.   Lymphadenopathy:      Cervical: No cervical adenopathy.   Skin:     General: Skin is warm and dry.   Neurological:      Mental Status: He is alert and oriented to person, place, and time.   Psychiatric:         Mood and Affect: Mood and affect normal.         Behavior: Behavior normal.         Thought Content: Thought content normal.         Judgment: Judgment normal.            Result Review :  The following data was reviewed by: VIJAY Roldan on 03/31/2025:    No visits with results within 1 Month(s) from this visit.   Latest known visit with results is:   Clinical Support on 10/07/2024   Component Date Value    WBC 10/07/2024 7.58     RBC 10/07/2024 4.09 (L)     Hemoglobin 10/07/2024 12.9 (L)     Hematocrit 10/07/2024 37.5     MCV 10/07/2024 91.7     MCH 10/07/2024 31.5     MCHC 10/07/2024 34.4     RDW 10/07/2024 13.4     RDW-SD 10/07/2024 45.3     MPV 10/07/2024 10.3     Platelets 10/07/2024 355     Neutrophil % 10/07/2024 46.7     Lymphocyte % 10/07/2024 39.4     Monocyte % 10/07/2024 8.3     Eosinophil % 10/07/2024 4.9     Basophil % 10/07/2024 0.4     Immature Grans % 10/07/2024 0.3     Neutrophils, Absolute 10/07/2024 3.54     Lymphocytes, Absolute 10/07/2024 2.99     Monocytes, Absolute 10/07/2024 0.63     Eosinophils, Absolute 10/07/2024  0.37     Basophils, Absolute 10/07/2024 0.03     Immature Grans, Absolute 10/07/2024 0.02     nRBC 10/07/2024 0.0     Glucose 10/07/2024 88     BUN 10/07/2024 11     Creatinine 10/07/2024 1.30 (H)     Sodium 10/07/2024 138     Potassium 10/07/2024 4.5     Chloride 10/07/2024 99     CO2 10/07/2024 28.4     Calcium 10/07/2024 10.1     Total Protein 10/07/2024 7.7     Albumin 10/07/2024 4.6     ALT (SGPT) 10/07/2024 26     AST (SGOT) 10/07/2024 25     Alkaline Phosphatase 10/07/2024 92     Total Bilirubin 10/07/2024 0.3     Globulin 10/07/2024 3.1     A/G Ratio 10/07/2024 1.5     BUN/Creatinine Ratio 10/07/2024 8.5     Anion Gap 10/07/2024 10.6     eGFR 10/07/2024 63.3     Hemoglobin A1C 10/07/2024 6.10 (H)     Total Cholesterol 10/07/2024 178     Triglycerides 10/07/2024 190 (H)     HDL Cholesterol 10/07/2024 36 (L)     LDL Cholesterol  10/07/2024 109 (H)     VLDL Cholesterol 10/07/2024 33     LDL/HDL Ratio 10/07/2024 2.89     TSH 10/07/2024 14.900 (H)     Free T4 10/07/2024 0.69 (L)     Microalbumin/Creatinine * 10/07/2024 11.3     Creatinine, Urine 10/07/2024 212.1     Microalbumin, Urine 10/07/2024 2.4     Iron 10/07/2024 70     Iron Saturation (TSAT) 10/07/2024 19 (L)     Transferrin 10/07/2024 252     TIBC 10/07/2024 375     Ferritin 10/07/2024 281.00     PSA 10/07/2024 1.100      CT Chest With Contrast Diagnostic (01/22/2023 15:42)     COLONOSCOPY (05/10/2023 09:19)     Procedures        Assessment and Plan   Diagnoses and all orders for this visit:    1. Annual physical exam (Primary)  -     CBC Auto Differential  -     Comprehensive Metabolic Panel  -     Hemoglobin A1c  -     Lipid Panel  -     TSH+Free T4  -     Microalbumin / Creatinine Urine Ratio - Urine, Clean Catch    2. Screening for lung cancer  -      CT Chest Low Dose Cancer Screening WO; Future    3. Cigarette nicotine dependence with other nicotine-induced disorder  -      CT Chest Low Dose Cancer Screening WO; Future    4. Essential  hypertension  -     hydroCHLOROthiazide 12.5 MG tablet; Take 1 tablet by mouth Daily.  Dispense: 30 tablet; Refill: 5  -     lisinopril (PRINIVIL,ZESTRIL) 20 MG tablet; Take 1 tablet by mouth 2 (Two) Times a Day.  Dispense: 60 tablet; Refill: 5    5. Dyslipidemia, goal LDL below 70  -     atorvastatin (LIPITOR) 40 MG tablet; Take 1 tablet by mouth Daily.  Dispense: 30 tablet; Refill: 5    6. Atherosclerotic cardiovascular disease 10 year risk greater than 30% using 2013 ACC/AHA calculator  -     atorvastatin (LIPITOR) 40 MG tablet; Take 1 tablet by mouth Daily.  Dispense: 30 tablet; Refill: 5    7. Pre-diabetes    8. Hypothyroidism, unspecified type  -     levothyroxine (SYNTHROID, LEVOTHROID) 112 MCG tablet; Take 1 tablet by mouth Daily.  Dispense: 30 tablet; Refill: 5    9. Anxiety  -     escitalopram (LEXAPRO) 20 MG tablet; Take 1 tablet by mouth Daily.  Dispense: 30 tablet; Refill: 5    10. Postprandial diarrhea  -     dicyclomine (BENTYL) 20 MG tablet; Take 1 tablet by mouth 4 (Four) Times a Day Before Meals & at Bedtime As Needed for Abdominal Cramping (diarrhea).  Dispense: 120 tablet; Refill: 0        Assessment & Plan  1. Chronic obstructive pulmonary disease (COPD).  He continues to smoke 1.5 packs per day since 1980 and is not currently motivated to quit. A lung cancer screening will be ordered today. If the insurance does not cover it, alternative imaging centers will be considered to reduce costs.    2. Anxiety.  He reports that his anxiety is manageable with Lexapro.    3. Irritable bowel syndrome (IBS).  He experiences loose stools and frequent bowel movements after eating. Differential diagnoses include gallbladder issues, celiac disease, alpha-gal syndrome, and food allergies. An antispasmodic medication will be prescribed, to be taken 20 to 30 minutes before meals and at bedtime as needed. Over-the-counter Metamucil capsules are recommended, starting with 2 capsules daily for a week. If there is no  improvement, the dosage can be increased to 4 capsules daily. If symptoms persist after a month of treatment, he should contact the office for further evaluation, which may include an ultrasound of the right upper quadrant to check for gallstones or blood work to assess for alpha-gal syndrome, celiac disease, and food allergies.    4. Hyperlipidemia.  He is advised to continue taking atorvastatin as prescribed.    5. Hypertension.  His blood pressure is well-controlled with the current regimen of hydrochlorothiazide and lisinopril. He should continue these medications as prescribed.    6. Hypothyroidism.  He ran out of his thyroid medication yesterday. A prescription refill will be sent to his pharmacy.    Follow-up  The patient will follow up in 6 months.                FOLLOW UP  Return in about 6 months (around 9/30/2025) for Next scheduled follow up, medication refills and fasting labs.    Patient was given instructions and counseling regarding his condition or for health maintenance advice. Please see specific information pulled into the AVS if appropriate.       Soheila Reyez, APRN  03/31/25  12:36 EDT    CURRENT & DISCONTINUED MEDICATIONS  Current Outpatient Medications   Medication Instructions    atorvastatin (LIPITOR) 40 mg, Oral, Daily    dicyclomine (BENTYL) 20 mg, Oral, 4 Times Daily Before Meals & Nightly PRN    Emollient (Aquaphor Advanced Therapy) ointment ointment 1 application , Topical, 2 Times Daily PRN    escitalopram (LEXAPRO) 20 mg, Oral, Daily    hydroCHLOROthiazide 12.5 mg, Oral, Daily    levothyroxine (SYNTHROID, LEVOTHROID) 112 mcg, Oral, Daily    lisinopril (PRINIVIL,ZESTRIL) 20 mg, Oral, 2 Times Daily       Medications Discontinued During This Encounter   Medication Reason    escitalopram (LEXAPRO) 20 MG tablet Reorder    atorvastatin (LIPITOR) 40 MG tablet Reorder    hydroCHLOROthiazide 12.5 MG tablet Reorder    lisinopril (PRINIVIL,ZESTRIL) 20 MG tablet Reorder    levothyroxine  (SYNTHROID, LEVOTHROID) 112 MCG tablet Reorder        Patient or patient representative verbalized consent for the use of Ambient Listening during the visit with  VIJAY Roldan for chart documentation. 3/31/2025  11:44 EDT

## 2025-03-31 NOTE — PROGRESS NOTES
Venipuncture Blood Specimen Collection  Venipuncture performed in left arm by Nena Crystal MA with good hemostasis. Patient tolerated the procedure well without complications.   03/31/25   Heaven Tavarez MA

## 2025-04-01 ENCOUNTER — RESULTS FOLLOW-UP (OUTPATIENT)
Dept: FAMILY MEDICINE CLINIC | Facility: CLINIC | Age: 60
End: 2025-04-01

## 2025-04-01 NOTE — LETTER
Simon Anthony  [address] [place] [id number]    April 9, 2025     Dear Mr. Anthony:    Your CT of the chest for lung cancer screening shows no new or suspicious pulmonary nodules. Mild centrilobular emphysema is noted, as well as a few calcifications in the aortic valve, and moderate coronary artery calcifications. Continue low-dose CT for lung cancer screening annually.     Resulted Orders   CBC Auto Differential   Result Value Ref Range    WBC 8.78 3.40 - 10.80 10*3/mm3    RBC 4.22 4.14 - 5.80 10*6/mm3    Hemoglobin 13.2 13.0 - 17.7 g/dL    Hematocrit 38.6 37.5 - 51.0 %    MCV 91.5 79.0 - 97.0 fL    MCH 31.3 26.6 - 33.0 pg    MCHC 34.2 31.5 - 35.7 g/dL    RDW 13.6 12.3 - 15.4 %    RDW-SD 45.9 37.0 - 54.0 fl    MPV 10.5 6.0 - 12.0 fL    Platelets 317 140 - 450 10*3/mm3    Neutrophil % 47.8 42.7 - 76.0 %    Lymphocyte % 38.5 19.6 - 45.3 %    Monocyte % 9.5 5.0 - 12.0 %    Eosinophil % 3.4 0.3 - 6.2 %    Basophil % 0.6 0.0 - 1.5 %    Immature Grans % 0.2 0.0 - 0.5 %    Neutrophils, Absolute 4.20 1.70 - 7.00 10*3/mm3    Lymphocytes, Absolute 3.38 (H) 0.70 - 3.10 10*3/mm3    Monocytes, Absolute 0.83 0.10 - 0.90 10*3/mm3    Eosinophils, Absolute 0.30 0.00 - 0.40 10*3/mm3    Basophils, Absolute 0.05 0.00 - 0.20 10*3/mm3    Immature Grans, Absolute 0.02 0.00 - 0.05 10*3/mm3    nRBC 0.0 0.0 - 0.2 /100 WBC   Comprehensive Metabolic Panel   Result Value Ref Range    Glucose 90 65 - 99 mg/dL    BUN 15 6 - 20 mg/dL    Creatinine 1.20 0.76 - 1.27 mg/dL    Sodium 136 136 - 145 mmol/L    Potassium 4.2 3.5 - 5.2 mmol/L    Chloride 98 98 - 107 mmol/L    CO2 28.4 22.0 - 29.0 mmol/L    Calcium 9.9 8.6 - 10.5 mg/dL    Total Protein 7.5 6.0 - 8.5 g/dL    Albumin 4.4 3.5 - 5.2 g/dL    ALT (SGPT) 46 (H) 1 - 41 U/L    AST (SGOT) 30 1 - 40 U/L    Alkaline Phosphatase 87 39 - 117 U/L    Total Bilirubin <0.2 0.0 - 1.2 mg/dL    Globulin 3.1 gm/dL    A/G Ratio 1.4 g/dL    BUN/Creatinine Ratio 12.5 7.0 - 25.0    Anion Gap 9.6 5.0 - 15.0  mmol/L    eGFR 69.7 >60.0 mL/min/1.73   Hemoglobin A1c   Result Value Ref Range    Hemoglobin A1C 6.10 (H) 4.80 - 5.60 %   Lipid Panel   Result Value Ref Range    Total Cholesterol 177 0 - 200 mg/dL    Triglycerides 283 (H) 0 - 150 mg/dL    HDL Cholesterol 34 (L) 40 - 60 mg/dL    LDL Cholesterol  95 0 - 100 mg/dL    VLDL Cholesterol 48 (H) 5 - 40 mg/dL    LDL/HDL Ratio 2.54    TSH+Free T4   Result Value Ref Range    TSH 0.634 0.270 - 4.200 uIU/mL    Free T4 0.99 0.92 - 1.68 ng/dL   Microalbumin / Creatinine Urine Ratio - Urine, Clean Catch   Result Value Ref Range    Microalbumin/Creatinine Ratio        Comment:      Unable to calculate    Creatinine, Urine 92.5 mg/dL    Microalbumin, Urine <1.2 mg/dL   CT Chest Low Dose Cancer Screening WO    Impression    Impression:  LDCT chest demonstrating no new or suspicious lung nodule. LD CT chest is recommended in a year.    Recommendation:  Continue annual screening with LDCT    Lung Rads Assessment:  Lung-RADS L1 - Negative, <1% chance of malignancy.        Electronically Signed: Gregorio Osorio MD    4/9/2025 2:08 PM EDT    Workstation ID: [id number]   Sincerely,  Soheila Reyez, APRN

## 2025-04-01 NOTE — LETTER
Simon Gonzalezillen  [address] [place] [id number]    April 1, 2025     Dear Mr. Anthony:  CMP shows normal glucose and renal function.  Mild elevation in the ALT, which is a liver function test, that has not been previously noted.  This could be due to recent weight gain.  Work on diet and exercise and we will repeat liver function test at your next appointment.     Total cholesterol and LDL cholesterol are normal; however, triglycerides and VLDL cholesterol are elevated.  You are already taking atorvastatin 40 mg daily.  I can increase this to 80 mg to see if it is more effective at managing your dyslipidemia, or I can switch to Crestor at 40 mg.  Please let me know what you would like to do in regards to this.  Also work on diet.  You did mention eating a lot of Faye's which is not good for your lipids.  Focus on lean cuts of meat such as fish, chicken, turkey.  Avoid red meat and pork, or at least limit intake.  Limit intake of full fat dairy products.  Increase fruits, vegetables, and whole grains.     CBC is within normal limits.     A1c remains stable at 6.1%.  You are still considered prediabetic.  Limit intake of carbohydrates, especially processed carbohydrates such as breads and pastas.  Limit starchy vegetables such as potatoes, corn, peas.  Try to get in at least 20 to 30 minutes of dedicated physical activity daily.  This is physical activity outside of your normal work routine.     Urine microalbumin and creatinine are normal.  Thyroid profile is currently normal.    Below are the results from your recent visit:    Resulted Orders   CBC Auto Differential   Result Value Ref Range    WBC 8.78 3.40 - 10.80 10*3/mm3    RBC 4.22 4.14 - 5.80 10*6/mm3    Hemoglobin 13.2 13.0 - 17.7 g/dL    Hematocrit 38.6 37.5 - 51.0 %    MCV 91.5 79.0 - 97.0 fL    MCH 31.3 26.6 - 33.0 pg    MCHC 34.2 31.5 - 35.7 g/dL    RDW 13.6 12.3 - 15.4 %    RDW-SD 45.9 37.0 - 54.0 fl    MPV 10.5 6.0 - 12.0 fL    Platelets 317 140  - 450 10*3/mm3    Neutrophil % 47.8 42.7 - 76.0 %    Lymphocyte % 38.5 19.6 - 45.3 %    Monocyte % 9.5 5.0 - 12.0 %    Eosinophil % 3.4 0.3 - 6.2 %    Basophil % 0.6 0.0 - 1.5 %    Immature Grans % 0.2 0.0 - 0.5 %    Neutrophils, Absolute 4.20 1.70 - 7.00 10*3/mm3    Lymphocytes, Absolute 3.38 (H) 0.70 - 3.10 10*3/mm3    Monocytes, Absolute 0.83 0.10 - 0.90 10*3/mm3    Eosinophils, Absolute 0.30 0.00 - 0.40 10*3/mm3    Basophils, Absolute 0.05 0.00 - 0.20 10*3/mm3    Immature Grans, Absolute 0.02 0.00 - 0.05 10*3/mm3    nRBC 0.0 0.0 - 0.2 /100 WBC   Comprehensive Metabolic Panel   Result Value Ref Range    Glucose 90 65 - 99 mg/dL    BUN 15 6 - 20 mg/dL    Creatinine 1.20 0.76 - 1.27 mg/dL    Sodium 136 136 - 145 mmol/L    Potassium 4.2 3.5 - 5.2 mmol/L    Chloride 98 98 - 107 mmol/L    CO2 28.4 22.0 - 29.0 mmol/L    Calcium 9.9 8.6 - 10.5 mg/dL    Total Protein 7.5 6.0 - 8.5 g/dL    Albumin 4.4 3.5 - 5.2 g/dL    ALT (SGPT) 46 (H) 1 - 41 U/L    AST (SGOT) 30 1 - 40 U/L    Alkaline Phosphatase 87 39 - 117 U/L    Total Bilirubin <0.2 0.0 - 1.2 mg/dL    Globulin 3.1 gm/dL    A/G Ratio 1.4 g/dL    BUN/Creatinine Ratio 12.5 7.0 - 25.0    Anion Gap 9.6 5.0 - 15.0 mmol/L    eGFR 69.7 >60.0 mL/min/1.73   Hemoglobin A1c   Result Value Ref Range    Hemoglobin A1C 6.10 (H) 4.80 - 5.60 %   Lipid Panel   Result Value Ref Range    Total Cholesterol 177 0 - 200 mg/dL    Triglycerides 283 (H) 0 - 150 mg/dL    HDL Cholesterol 34 (L) 40 - 60 mg/dL    LDL Cholesterol  95 0 - 100 mg/dL    VLDL Cholesterol 48 (H) 5 - 40 mg/dL    LDL/HDL Ratio 2.54    TSH+Free T4   Result Value Ref Range    TSH 0.634 0.270 - 4.200 uIU/mL    Free T4 0.99 0.92 - 1.68 ng/dL   Microalbumin / Creatinine Urine Ratio - Urine, Clean Catch   Result Value Ref Range    Microalbumin/Creatinine Ratio        Comment:      Unable to calculate    Creatinine, Urine 92.5 mg/dL    Microalbumin, Urine <1.2 mg/dL   If you have any questions or concerns, please don't hesitate  to call.  Sincerely,  Soheila Reyez, APRN

## 2025-04-07 ENCOUNTER — HOSPITAL ENCOUNTER (OUTPATIENT)
Dept: CT IMAGING | Facility: HOSPITAL | Age: 60
Discharge: HOME | End: 2025-04-07
Admitting: NURSE PRACTITIONER
Payer: COMMERCIAL

## 2025-04-07 DIAGNOSIS — Z12.2 SCREENING FOR LUNG CANCER: ICD-10-CM

## 2025-04-07 DIAGNOSIS — F17.218 CIGARETTE NICOTINE DEPENDENCE WITH OTHER NICOTINE-INDUCED DISORDER: ICD-10-CM

## 2025-04-07 PROCEDURE — 71271 CT THORAX LUNG CANCER SCR C-: CPT

## (undated) DEVICE — SOLIDIFIER LIQLOC PLS 1500CC BT

## (undated) DEVICE — Device

## (undated) DEVICE — LINER SURG CANSTR SXN S/RIGD 1500CC

## (undated) DEVICE — CONN JET HYDRA H20 AUXILIARY DISP

## (undated) DEVICE — SOL IRRG H2O PL/BG 1000ML STRL

## (undated) DEVICE — Device: Brand: DEFENDO AIR/WATER/SUCTION AND BIOPSY VALVE

## (undated) DEVICE — SINGLE-USE BIOPSY FORCEPS: Brand: RADIAL JAW 4